# Patient Record
Sex: FEMALE | Race: WHITE | NOT HISPANIC OR LATINO | Employment: FULL TIME | URBAN - METROPOLITAN AREA
[De-identification: names, ages, dates, MRNs, and addresses within clinical notes are randomized per-mention and may not be internally consistent; named-entity substitution may affect disease eponyms.]

---

## 2017-04-19 ENCOUNTER — HISTORICAL (OUTPATIENT)
Dept: LAB | Facility: HOSPITAL | Age: 47
End: 2017-04-19

## 2017-10-23 ENCOUNTER — HISTORICAL (OUTPATIENT)
Dept: ADMINISTRATIVE | Facility: HOSPITAL | Age: 47
End: 2017-10-23

## 2017-10-23 LAB
ABS NEUT (OLG): 2.68 X10(3)/MCL (ref 2.1–9.2)
ALBUMIN SERPL-MCNC: 3.7 GM/DL (ref 3.4–5)
ALBUMIN/GLOB SERPL: 1.3 {RATIO}
ALP SERPL-CCNC: 83 UNIT/L (ref 38–126)
ALT SERPL-CCNC: 44 UNIT/L (ref 12–78)
APPEARANCE, UA: NORMAL
AST SERPL-CCNC: 17 UNIT/L (ref 15–37)
BACTERIA SPEC CULT: NORMAL /HPF
BASOPHILS # BLD AUTO: 0 X10(3)/MCL (ref 0–0.2)
BASOPHILS NFR BLD AUTO: 0 %
BILIRUB SERPL-MCNC: 0.5 MG/DL (ref 0.2–1)
BILIRUB UR QL STRIP: NEGATIVE
BILIRUBIN DIRECT+TOT PNL SERPL-MCNC: 0.1 MG/DL (ref 0–0.2)
BILIRUBIN DIRECT+TOT PNL SERPL-MCNC: 0.4 MG/DL (ref 0–0.8)
BUN SERPL-MCNC: 11 MG/DL (ref 7–18)
CALCIUM SERPL-MCNC: 8.5 MG/DL (ref 8.5–10.1)
CHLORIDE SERPL-SCNC: 105 MMOL/L (ref 98–107)
CHOLEST SERPL-MCNC: 142 MG/DL (ref 0–200)
CHOLEST/HDLC SERPL: 2.1 {RATIO} (ref 0–4)
CO2 SERPL-SCNC: 28 MMOL/L (ref 21–32)
COLOR UR: YELLOW
CREAT SERPL-MCNC: 0.63 MG/DL (ref 0.55–1.02)
EOSINOPHIL # BLD AUTO: 0.1 X10(3)/MCL (ref 0–0.9)
EOSINOPHIL NFR BLD AUTO: 1 %
ERYTHROCYTE [DISTWIDTH] IN BLOOD BY AUTOMATED COUNT: 12.3 % (ref 11.5–17)
FERRITIN SERPL-MCNC: 135.4 NG/ML (ref 8–388)
GLOBULIN SER-MCNC: 2.9 GM/DL (ref 2.4–3.5)
GLUCOSE (UA): NEGATIVE
GLUCOSE SERPL-MCNC: 104 MG/DL (ref 74–106)
HCT VFR BLD AUTO: 39 % (ref 37–47)
HDLC SERPL-MCNC: 67 MG/DL (ref 35–60)
HGB BLD-MCNC: 13.6 GM/DL (ref 12–16)
HGB UR QL STRIP: NEGATIVE
KETONES UR QL STRIP: NEGATIVE
LDLC SERPL CALC-MCNC: 66 MG/DL (ref 0–129)
LEUKOCYTE ESTERASE UR QL STRIP: NEGATIVE
LYMPHOCYTES # BLD AUTO: 1.6 X10(3)/MCL (ref 0.6–4.6)
LYMPHOCYTES NFR BLD AUTO: 34 %
MCH RBC QN AUTO: 31.8 PG (ref 27–31)
MCHC RBC AUTO-ENTMCNC: 34.9 GM/DL (ref 33–36)
MCV RBC AUTO: 91.1 FL (ref 80–94)
MONOCYTES # BLD AUTO: 0.5 X10(3)/MCL (ref 0.1–1.3)
MONOCYTES NFR BLD AUTO: 10 %
NEUTROPHILS # BLD AUTO: 2.68 X10(3)/MCL (ref 1.4–7.9)
NEUTROPHILS NFR BLD AUTO: 55 %
NITRITE UR QL STRIP: NEGATIVE
PH UR STRIP: 5.5 [PH] (ref 5–9)
PLATELET # BLD AUTO: 249 X10(3)/MCL (ref 130–400)
PMV BLD AUTO: 8.2 FL (ref 9.4–12.4)
POTASSIUM SERPL-SCNC: 4 MMOL/L (ref 3.5–5.1)
PROT SERPL-MCNC: 6.6 GM/DL (ref 6.4–8.2)
PROT UR QL STRIP: NEGATIVE
RBC # BLD AUTO: 4.28 X10(6)/MCL (ref 4.2–5.4)
RBC #/AREA URNS HPF: NORMAL /[HPF]
SODIUM SERPL-SCNC: 140 MMOL/L (ref 136–145)
SP GR UR STRIP: 1.01 (ref 1–1.03)
SQUAMOUS EPITHELIAL, UA: NORMAL
T3FREE SERPL-MCNC: 6.87 PG/ML (ref 2.18–3.98)
T4 FREE SERPL-MCNC: 0.83 NG/DL (ref 0.76–1.46)
TRIGL SERPL-MCNC: 44 MG/DL (ref 30–150)
TSH SERPL-ACNC: 0.65 MIU/ML (ref 0.36–3.74)
UROBILINOGEN UR STRIP-ACNC: 0.2
VLDLC SERPL CALC-MCNC: 9 MG/DL
WBC # SPEC AUTO: 4.9 X10(3)/MCL (ref 4.5–11.5)
WBC #/AREA URNS HPF: NORMAL /[HPF]

## 2018-01-08 LAB — RAPID GROUP A STREP (OHS): NEGATIVE

## 2018-04-19 ENCOUNTER — HISTORICAL (OUTPATIENT)
Dept: LAB | Facility: HOSPITAL | Age: 48
End: 2018-04-19

## 2018-04-19 LAB
T3FREE SERPL-MCNC: 3.82 PG/ML (ref 2.18–3.98)
T4 FREE SERPL-MCNC: 0.85 NG/DL (ref 0.76–1.46)
TSH SERPL-ACNC: 0.19 MIU/ML (ref 0.36–3.74)

## 2018-10-25 ENCOUNTER — HISTORICAL (OUTPATIENT)
Dept: LAB | Facility: HOSPITAL | Age: 48
End: 2018-10-25

## 2018-10-25 LAB
T3FREE SERPL-MCNC: 3.87 PG/ML (ref 2.18–3.98)
T4 FREE SERPL-MCNC: 0.84 NG/DL (ref 0.76–1.46)
TSH SERPL-ACNC: 0.22 MIU/L (ref 0.36–3.74)

## 2019-05-07 ENCOUNTER — HISTORICAL (OUTPATIENT)
Dept: LAB | Facility: HOSPITAL | Age: 49
End: 2019-05-07

## 2019-05-07 LAB
ABS NEUT (OLG): 2.21 X10(3)/MCL (ref 2.1–9.2)
ALBUMIN SERPL-MCNC: 4.1 GM/DL (ref 3.4–5)
ALBUMIN/GLOB SERPL: 1.2 RATIO (ref 1.1–2)
ALP SERPL-CCNC: 98 UNIT/L (ref 38–126)
ALT SERPL-CCNC: 43 UNIT/L (ref 12–78)
APPEARANCE, UA: ABNORMAL
AST SERPL-CCNC: 23 UNIT/L (ref 15–37)
BACTERIA SPEC CULT: ABNORMAL /HPF
BASOPHILS # BLD AUTO: 0 X10(3)/MCL (ref 0–0.2)
BASOPHILS NFR BLD AUTO: 0 %
BILIRUB SERPL-MCNC: 1.2 MG/DL (ref 0.2–1)
BILIRUB UR QL STRIP: NEGATIVE
BILIRUBIN DIRECT+TOT PNL SERPL-MCNC: 0.3 MG/DL (ref 0–0.5)
BILIRUBIN DIRECT+TOT PNL SERPL-MCNC: 0.9 MG/DL (ref 0–0.8)
BUN SERPL-MCNC: 9 MG/DL (ref 7–18)
CALCIUM SERPL-MCNC: 9.2 MG/DL (ref 8.5–10.1)
CHLORIDE SERPL-SCNC: 105 MMOL/L (ref 98–107)
CHOLEST SERPL-MCNC: 155 MG/DL (ref 0–200)
CHOLEST/HDLC SERPL: 1.9 {RATIO} (ref 0–4)
CO2 SERPL-SCNC: 30 MMOL/L (ref 21–32)
COLOR UR: YELLOW
CREAT SERPL-MCNC: 0.68 MG/DL (ref 0.55–1.02)
EOSINOPHIL # BLD AUTO: 0.1 X10(3)/MCL (ref 0–0.9)
EOSINOPHIL NFR BLD AUTO: 2 %
ERYTHROCYTE [DISTWIDTH] IN BLOOD BY AUTOMATED COUNT: 12.2 % (ref 11.5–17)
EST. AVERAGE GLUCOSE BLD GHB EST-MCNC: 88 MG/DL
GLOBULIN SER-MCNC: 3.4 GM/DL (ref 2.4–3.5)
GLUCOSE (UA): NEGATIVE
GLUCOSE SERPL-MCNC: 88 MG/DL (ref 74–106)
HBA1C MFR BLD: 4.7 % (ref 4.2–6.3)
HCT VFR BLD AUTO: 43.5 % (ref 37–47)
HDLC SERPL-MCNC: 81 MG/DL (ref 35–60)
HGB BLD-MCNC: 14.2 GM/DL (ref 12–16)
HGB UR QL STRIP: NEGATIVE
KETONES UR QL STRIP: ABNORMAL
LDLC SERPL CALC-MCNC: 63 MG/DL (ref 0–129)
LEUKOCYTE ESTERASE UR QL STRIP: ABNORMAL
LYMPHOCYTES # BLD AUTO: 1.4 X10(3)/MCL (ref 0.6–4.6)
LYMPHOCYTES NFR BLD AUTO: 35 %
MCH RBC QN AUTO: 30.5 PG (ref 27–31)
MCHC RBC AUTO-ENTMCNC: 32.6 GM/DL (ref 33–36)
MCV RBC AUTO: 93.3 FL (ref 80–94)
MONOCYTES # BLD AUTO: 0.4 X10(3)/MCL (ref 0.1–1.3)
MONOCYTES NFR BLD AUTO: 10 %
NEUTROPHILS # BLD AUTO: 2.21 X10(3)/MCL (ref 2.1–9.2)
NEUTROPHILS NFR BLD AUTO: 53 %
NITRITE UR QL STRIP: NEGATIVE
PH UR STRIP: 5 [PH] (ref 5–9)
PLATELET # BLD AUTO: 299 X10(3)/MCL (ref 130–400)
PMV BLD AUTO: 9 FL (ref 9.4–12.4)
POTASSIUM SERPL-SCNC: 4 MMOL/L (ref 3.5–5.1)
PROT SERPL-MCNC: 7.5 GM/DL (ref 6.4–8.2)
PROT UR QL STRIP: NEGATIVE
RBC # BLD AUTO: 4.66 X10(6)/MCL (ref 4.2–5.4)
RBC #/AREA URNS HPF: ABNORMAL /[HPF]
SODIUM SERPL-SCNC: 140 MMOL/L (ref 136–145)
SP GR UR STRIP: 1.02 (ref 1–1.03)
SQUAMOUS EPITHELIAL, UA: 6 /HPF (ref 0–4)
T3FREE SERPL-MCNC: 4.67 PG/ML (ref 2.18–3.98)
T4 FREE SERPL-MCNC: 1.1 NG/DL (ref 0.76–1.46)
TRIGL SERPL-MCNC: 57 MG/DL (ref 30–150)
TSH SERPL-ACNC: 0.27 MIU/L (ref 0.36–3.74)
UROBILINOGEN UR STRIP-ACNC: 1
VLDLC SERPL CALC-MCNC: 11 MG/DL
WBC # SPEC AUTO: 4.2 X10(3)/MCL (ref 4.5–11.5)
WBC #/AREA URNS HPF: 38 /HPF (ref 0–3)

## 2019-05-09 LAB — FINAL CULTURE: NO GROWTH

## 2019-11-20 LAB — NONINV COLON CA DNA+OCC BLD SCRN STL QL: NEGATIVE

## 2020-01-30 ENCOUNTER — HISTORICAL (OUTPATIENT)
Dept: LAB | Facility: HOSPITAL | Age: 50
End: 2020-01-30

## 2020-01-30 LAB
FSH SERPL-ACNC: 78.1 MIU/ML
LH SERPL-ACNC: 38.4 MIU/ML
T3FREE SERPL-MCNC: 4.06 PG/ML (ref 2.18–3.98)
T4 FREE SERPL-MCNC: 1 NG/DL (ref 0.76–1.46)
TSH SERPL-ACNC: 0.2 MIU/L (ref 0.36–3.74)

## 2020-06-13 ENCOUNTER — HISTORICAL (OUTPATIENT)
Dept: LAB | Facility: HOSPITAL | Age: 50
End: 2020-06-13

## 2020-06-13 LAB
ABS NEUT (OLG): 1.25 X10(3)/MCL (ref 2.1–9.2)
ALBUMIN SERPL-MCNC: 3.9 GM/DL (ref 3.4–5)
ALBUMIN/GLOB SERPL: 1.2 RATIO (ref 1.1–2)
ALP SERPL-CCNC: 70 UNIT/L (ref 46–116)
ALT SERPL-CCNC: 38 UNIT/L (ref 12–78)
APPEARANCE, UA: CLEAR
AST SERPL-CCNC: 25 UNIT/L (ref 15–37)
BACTERIA SPEC CULT: NORMAL
BASOPHILS # BLD AUTO: 0 X10(3)/MCL (ref 0–0.2)
BASOPHILS NFR BLD AUTO: 0 %
BILIRUB SERPL-MCNC: 0.5 MG/DL (ref 0.2–1)
BILIRUB UR QL STRIP: NEGATIVE
BILIRUBIN DIRECT+TOT PNL SERPL-MCNC: 0.18 MG/DL (ref 0–0.2)
BILIRUBIN DIRECT+TOT PNL SERPL-MCNC: 0.32 MG/DL (ref 0–0.8)
BUN SERPL-MCNC: 21.9 MG/DL (ref 7–18)
CALCIUM SERPL-MCNC: 9 MG/DL (ref 8.5–10.1)
CHLORIDE SERPL-SCNC: 105 MMOL/L (ref 98–107)
CHOLEST SERPL-MCNC: 151 MG/DL (ref 0–200)
CHOLEST/HDLC SERPL: 2 {RATIO} (ref 0–4)
CO2 SERPL-SCNC: 28.2 MMOL/L (ref 21–32)
COLOR UR: YELLOW
CREAT SERPL-MCNC: 0.71 MG/DL (ref 0.6–1.3)
EOSINOPHIL # BLD AUTO: 0.1 X10(3)/MCL (ref 0–0.9)
EOSINOPHIL NFR BLD AUTO: 4 %
ERYTHROCYTE [DISTWIDTH] IN BLOOD BY AUTOMATED COUNT: 12.4 % (ref 11.5–17)
EST. AVERAGE GLUCOSE BLD GHB EST-MCNC: 94 MG/DL
GLOBULIN SER-MCNC: 3.2 GM/DL (ref 2.4–3.5)
GLUCOSE (UA): NEGATIVE
GLUCOSE SERPL-MCNC: 101 MG/DL (ref 74–106)
HBA1C MFR BLD: 4.9 % (ref 4.5–6.2)
HCT VFR BLD AUTO: 40.7 % (ref 37–47)
HDLC SERPL-MCNC: 76 MG/DL (ref 40–60)
HGB BLD-MCNC: 13.6 GM/DL (ref 12–16)
HGB UR QL STRIP: NEGATIVE
KETONES UR QL STRIP: NEGATIVE
LDLC SERPL CALC-MCNC: 70 MG/DL (ref 0–129)
LEUKOCYTE ESTERASE UR QL STRIP: NEGATIVE
LYMPHOCYTES # BLD AUTO: 1.4 X10(3)/MCL (ref 0.6–4.6)
LYMPHOCYTES NFR BLD AUTO: 44 %
MCH RBC QN AUTO: 30.6 PG (ref 27–31)
MCHC RBC AUTO-ENTMCNC: 33.4 GM/DL (ref 33–36)
MCV RBC AUTO: 91.7 FL (ref 80–94)
MONOCYTES # BLD AUTO: 0.4 X10(3)/MCL (ref 0.1–1.3)
MONOCYTES NFR BLD AUTO: 12 %
NEUTROPHILS # BLD AUTO: 1.25 X10(3)/MCL (ref 1.4–7.9)
NEUTROPHILS NFR BLD AUTO: 40 %
NITRITE UR QL STRIP: NEGATIVE
PH UR STRIP: 5.5 [PH] (ref 5–9)
PLATELET # BLD AUTO: 267 X10(3)/MCL (ref 130–400)
PMV BLD AUTO: 8.8 FL (ref 9.4–12.4)
POTASSIUM SERPL-SCNC: 4.5 MMOL/L (ref 3.5–5.1)
PROT SERPL-MCNC: 7.1 GM/DL (ref 6.4–8.2)
PROT UR QL STRIP: NEGATIVE
RBC # BLD AUTO: 4.44 X10(6)/MCL (ref 4.2–5.4)
RBC #/AREA URNS HPF: NORMAL /[HPF]
SODIUM SERPL-SCNC: 142 MMOL/L (ref 136–145)
SP GR UR STRIP: 1.02 (ref 1–1.03)
SQUAMOUS EPITHELIAL, UA: NORMAL
TRIGL SERPL-MCNC: 23 MG/DL
TSH SERPL-ACNC: 0.1 MIU/ML (ref 0.36–3.74)
UROBILINOGEN UR STRIP-ACNC: 0.2
VLDLC SERPL CALC-MCNC: 5 MG/DL
WBC # SPEC AUTO: 3.1 X10(3)/MCL (ref 4.5–11.5)
WBC #/AREA URNS HPF: NORMAL /[HPF]

## 2020-06-17 ENCOUNTER — HISTORICAL (OUTPATIENT)
Dept: ADMINISTRATIVE | Facility: HOSPITAL | Age: 50
End: 2020-06-17

## 2020-06-17 LAB — T4 FREE SERPL-MCNC: 0.79 NG/DL (ref 0.7–1.48)

## 2020-09-14 ENCOUNTER — HISTORICAL (OUTPATIENT)
Dept: ADMINISTRATIVE | Facility: HOSPITAL | Age: 50
End: 2020-09-14

## 2020-09-14 LAB
T3FREE SERPL-MCNC: 5.11 PG/ML (ref 1.71–3.71)
T4 FREE SERPL-MCNC: 0.89 NG/DL (ref 0.7–1.48)
TSH SERPL-ACNC: 0.68 UIU/ML (ref 0.35–4.94)

## 2020-11-24 LAB — BCS RECOMMENDATION EXT: NORMAL

## 2020-12-30 ENCOUNTER — HISTORICAL (OUTPATIENT)
Dept: ADMINISTRATIVE | Facility: HOSPITAL | Age: 50
End: 2020-12-30

## 2020-12-30 LAB
HCV AB SERPL QL IA: NONREACTIVE
T3FREE SERPL-MCNC: 2.01 PG/ML (ref 1.71–3.71)
T4 FREE SERPL-MCNC: 0.71 NG/DL (ref 0.7–1.48)
TSH SERPL-ACNC: 8.65 UIU/ML (ref 0.35–4.94)

## 2021-05-06 LAB
PAP RECOMMENDATION EXT: NORMAL
PAP SMEAR: NORMAL

## 2021-06-21 ENCOUNTER — HISTORICAL (OUTPATIENT)
Dept: ADMINISTRATIVE | Facility: HOSPITAL | Age: 51
End: 2021-06-21

## 2021-06-21 LAB
ABS NEUT (OLG): 2.65 X10(3)/MCL (ref 2.1–9.2)
ALBUMIN SERPL-MCNC: 3.9 GM/DL (ref 3.5–5)
ALBUMIN/GLOB SERPL: 1.4 RATIO (ref 1.1–2)
ALP SERPL-CCNC: 95 UNIT/L (ref 40–150)
ALT SERPL-CCNC: 98 UNIT/L (ref 0–55)
APPEARANCE, UA: CLEAR
AST SERPL-CCNC: 48 UNIT/L (ref 5–34)
BACTERIA SPEC CULT: ABNORMAL /HPF
BASOPHILS # BLD AUTO: 0 X10(3)/MCL (ref 0–0.2)
BASOPHILS NFR BLD AUTO: 0 %
BILIRUB SERPL-MCNC: 0.7 MG/DL
BILIRUB UR QL STRIP: NEGATIVE
BILIRUBIN DIRECT+TOT PNL SERPL-MCNC: 0.3 MG/DL (ref 0–0.5)
BILIRUBIN DIRECT+TOT PNL SERPL-MCNC: 0.4 MG/DL (ref 0–0.8)
BUN SERPL-MCNC: 14.8 MG/DL (ref 9.8–20.1)
CALCIUM SERPL-MCNC: 9.3 MG/DL (ref 8.4–10.2)
CHLORIDE SERPL-SCNC: 104 MMOL/L (ref 98–107)
CHOLEST SERPL-MCNC: 154 MG/DL
CHOLEST/HDLC SERPL: 2 {RATIO} (ref 0–5)
CO2 SERPL-SCNC: 29 MMOL/L (ref 22–29)
COLOR UR: YELLOW
CREAT SERPL-MCNC: 0.76 MG/DL (ref 0.55–1.02)
DEPRECATED CALCIDIOL+CALCIFEROL SERPL-MC: 28.1 NG/ML (ref 30–80)
EOSINOPHIL # BLD AUTO: 0 X10(3)/MCL (ref 0–0.9)
EOSINOPHIL NFR BLD AUTO: 1 %
ERYTHROCYTE [DISTWIDTH] IN BLOOD BY AUTOMATED COUNT: 12.4 % (ref 11.5–17)
EST. AVERAGE GLUCOSE BLD GHB EST-MCNC: 91.1 MG/DL
GLOBULIN SER-MCNC: 2.7 GM/DL (ref 2.4–3.5)
GLUCOSE (UA): NEGATIVE
GLUCOSE SERPL-MCNC: 110 MG/DL (ref 74–100)
HAV IGM SERPL QL IA: NONREACTIVE
HBA1C MFR BLD: 4.8 %
HBV CORE IGM SERPL QL IA: NONREACTIVE
HBV SURFACE AG SERPL QL IA: NONREACTIVE
HCT VFR BLD AUTO: 42.5 % (ref 37–47)
HCV AB SERPL QL IA: NONREACTIVE
HDLC SERPL-MCNC: 77 MG/DL (ref 35–60)
HEPATITIS PANEL INTERP: NORMAL
HGB BLD-MCNC: 13.6 GM/DL (ref 12–16)
HGB UR QL STRIP: NEGATIVE
KETONES UR QL STRIP: NEGATIVE
LDLC SERPL CALC-MCNC: 69 MG/DL (ref 50–140)
LEUKOCYTE ESTERASE UR QL STRIP: ABNORMAL
LYMPHOCYTES # BLD AUTO: 1.7 X10(3)/MCL (ref 0.6–4.6)
LYMPHOCYTES NFR BLD AUTO: 36 %
MCH RBC QN AUTO: 31.3 PG (ref 27–31)
MCHC RBC AUTO-ENTMCNC: 32 GM/DL (ref 33–36)
MCV RBC AUTO: 97.7 FL (ref 80–94)
MONOCYTES # BLD AUTO: 0.3 X10(3)/MCL (ref 0.1–1.3)
MONOCYTES NFR BLD AUTO: 7 %
NEUTROPHILS # BLD AUTO: 2.65 X10(3)/MCL (ref 2.1–9.2)
NEUTROPHILS NFR BLD AUTO: 56 %
NITRITE UR QL STRIP: NEGATIVE
PH UR STRIP: 6.5 [PH] (ref 5–9)
PLATELET # BLD AUTO: 343 X10(3)/MCL (ref 130–400)
PMV BLD AUTO: 9.1 FL (ref 9.4–12.4)
POTASSIUM SERPL-SCNC: 4 MMOL/L (ref 3.5–5.1)
PROT SERPL-MCNC: 6.6 GM/DL (ref 6.4–8.3)
PROT UR QL STRIP: NEGATIVE
RBC # BLD AUTO: 4.35 X10(6)/MCL (ref 4.2–5.4)
RBC #/AREA URNS HPF: ABNORMAL /[HPF]
SODIUM SERPL-SCNC: 140 MMOL/L (ref 136–145)
SP GR UR STRIP: 1.01 (ref 1–1.03)
SQUAMOUS EPITHELIAL, UA: ABNORMAL /HPF (ref 0–4)
TRIGL SERPL-MCNC: 42 MG/DL (ref 37–140)
TSH SERPL-ACNC: 0.93 UIU/ML (ref 0.35–4.94)
UROBILINOGEN UR STRIP-ACNC: 0.2
VLDLC SERPL CALC-MCNC: 8 MG/DL
WBC # SPEC AUTO: 4.7 X10(3)/MCL (ref 4.5–11.5)
WBC #/AREA URNS HPF: ABNORMAL /[HPF]

## 2021-07-02 LAB — SARS-COV-2 AG RESP QL IA.RAPID: NEGATIVE

## 2021-07-07 ENCOUNTER — HISTORICAL (OUTPATIENT)
Dept: SURGERY | Facility: HOSPITAL | Age: 51
End: 2021-07-07

## 2021-08-25 ENCOUNTER — HISTORICAL (OUTPATIENT)
Dept: RADIOLOGY | Facility: HOSPITAL | Age: 51
End: 2021-08-25

## 2021-12-30 ENCOUNTER — HISTORICAL (OUTPATIENT)
Dept: ADMINISTRATIVE | Facility: HOSPITAL | Age: 51
End: 2021-12-30

## 2021-12-30 LAB
ALBUMIN SERPL-MCNC: 3.7 GM/DL (ref 3.5–5)
ALBUMIN/GLOB SERPL: 1.4 RATIO (ref 1.1–2)
ALP SERPL-CCNC: 87 UNIT/L (ref 40–150)
ALT SERPL-CCNC: 40 UNIT/L (ref 0–55)
AST SERPL-CCNC: 27 UNIT/L (ref 5–34)
BILIRUB SERPL-MCNC: 0.4 MG/DL
BILIRUBIN DIRECT+TOT PNL SERPL-MCNC: 0.2 MG/DL (ref 0–0.5)
BILIRUBIN DIRECT+TOT PNL SERPL-MCNC: 0.2 MG/DL (ref 0–0.8)
BUN SERPL-MCNC: 12.7 MG/DL (ref 9.8–20.1)
CALCIUM SERPL-MCNC: 8.6 MG/DL (ref 8.7–10.5)
CHLORIDE SERPL-SCNC: 108 MMOL/L (ref 98–107)
CO2 SERPL-SCNC: 27 MMOL/L (ref 22–29)
CREAT SERPL-MCNC: 0.71 MG/DL (ref 0.55–1.02)
GLOBULIN SER-MCNC: 2.7 GM/DL (ref 2.4–3.5)
GLUCOSE SERPL-MCNC: 87 MG/DL (ref 74–100)
POTASSIUM SERPL-SCNC: 4.2 MMOL/L (ref 3.5–5.1)
PROT SERPL-MCNC: 6.4 GM/DL (ref 6.4–8.3)
SODIUM SERPL-SCNC: 143 MMOL/L (ref 136–145)
T4 FREE SERPL-MCNC: 0.91 NG/DL (ref 0.7–1.48)
TSH SERPL-ACNC: 0.02 UIU/ML (ref 0.35–4.94)

## 2022-04-10 ENCOUNTER — HISTORICAL (OUTPATIENT)
Dept: ADMINISTRATIVE | Facility: HOSPITAL | Age: 52
End: 2022-04-10

## 2022-04-26 VITALS
SYSTOLIC BLOOD PRESSURE: 134 MMHG | BODY MASS INDEX: 31.36 KG/M2 | OXYGEN SATURATION: 98 % | HEIGHT: 66 IN | WEIGHT: 195.13 LBS | DIASTOLIC BLOOD PRESSURE: 76 MMHG

## 2022-06-29 RX ORDER — FUROSEMIDE 20 MG/1
1 TABLET ORAL DAILY PRN
COMMUNITY
Start: 2021-09-22 | End: 2022-11-22 | Stop reason: SDUPTHER

## 2022-06-29 RX ORDER — LORATADINE 10 MG/1
10 TABLET ORAL DAILY
COMMUNITY
Start: 2021-07-07

## 2022-06-30 ENCOUNTER — OFFICE VISIT (OUTPATIENT)
Dept: FAMILY MEDICINE | Facility: CLINIC | Age: 52
End: 2022-06-30
Payer: COMMERCIAL

## 2022-06-30 VITALS
HEIGHT: 66 IN | BODY MASS INDEX: 29.44 KG/M2 | WEIGHT: 183.19 LBS | RESPIRATION RATE: 17 BRPM | DIASTOLIC BLOOD PRESSURE: 72 MMHG | HEART RATE: 67 BPM | OXYGEN SATURATION: 98 % | TEMPERATURE: 98 F | SYSTOLIC BLOOD PRESSURE: 138 MMHG

## 2022-06-30 DIAGNOSIS — Z00.00 PREVENTATIVE HEALTH CARE: ICD-10-CM

## 2022-06-30 DIAGNOSIS — E03.8 OTHER SPECIFIED HYPOTHYROIDISM: Primary | ICD-10-CM

## 2022-06-30 DIAGNOSIS — F41.9 ANXIETY: Chronic | ICD-10-CM

## 2022-06-30 PROBLEM — Z83.49 FAMILY HISTORY OF HEMOCHROMATOSIS: Status: ACTIVE | Noted: 2022-06-30

## 2022-06-30 PROBLEM — R23.2 FLUSHING: Status: ACTIVE | Noted: 2022-06-30

## 2022-06-30 PROBLEM — E66.9 OBESITY: Chronic | Status: ACTIVE | Noted: 2022-06-30

## 2022-06-30 PROBLEM — R42 VERTIGO: Status: ACTIVE | Noted: 2022-06-30

## 2022-06-30 PROBLEM — E03.4 ACQUIRED ATROPHY OF THYROID: Status: ACTIVE | Noted: 2022-06-30

## 2022-06-30 PROBLEM — A49.02 METHICILLIN RESISTANT STAPHYLOCOCCUS AUREUS INFECTION: Status: ACTIVE | Noted: 2022-06-30

## 2022-06-30 PROBLEM — Z83.49 FAMILY HISTORY OF HEMOCHROMATOSIS: Chronic | Status: ACTIVE | Noted: 2022-06-30

## 2022-06-30 PROBLEM — E66.9 OBESITY: Status: ACTIVE | Noted: 2022-06-30

## 2022-06-30 PROBLEM — R42 VERTIGO: Chronic | Status: ACTIVE | Noted: 2022-06-30

## 2022-06-30 PROCEDURE — 3075F PR MOST RECENT SYSTOLIC BLOOD PRESS GE 130-139MM HG: ICD-10-PCS | Mod: CPTII,,, | Performed by: FAMILY MEDICINE

## 2022-06-30 PROCEDURE — 3008F BODY MASS INDEX DOCD: CPT | Mod: CPTII,,, | Performed by: FAMILY MEDICINE

## 2022-06-30 PROCEDURE — 99213 OFFICE O/P EST LOW 20 MIN: CPT | Mod: ,,, | Performed by: FAMILY MEDICINE

## 2022-06-30 PROCEDURE — 3008F PR BODY MASS INDEX (BMI) DOCUMENTED: ICD-10-PCS | Mod: CPTII,,, | Performed by: FAMILY MEDICINE

## 2022-06-30 PROCEDURE — 1159F PR MEDICATION LIST DOCUMENTED IN MEDICAL RECORD: ICD-10-PCS | Mod: CPTII,,, | Performed by: FAMILY MEDICINE

## 2022-06-30 PROCEDURE — 99213 PR OFFICE/OUTPT VISIT, EST, LEVL III, 20-29 MIN: ICD-10-PCS | Mod: ,,, | Performed by: FAMILY MEDICINE

## 2022-06-30 PROCEDURE — 3075F SYST BP GE 130 - 139MM HG: CPT | Mod: CPTII,,, | Performed by: FAMILY MEDICINE

## 2022-06-30 PROCEDURE — 1160F RVW MEDS BY RX/DR IN RCRD: CPT | Mod: CPTII,,, | Performed by: FAMILY MEDICINE

## 2022-06-30 PROCEDURE — 1159F MED LIST DOCD IN RCRD: CPT | Mod: CPTII,,, | Performed by: FAMILY MEDICINE

## 2022-06-30 PROCEDURE — 1160F PR REVIEW ALL MEDS BY PRESCRIBER/CLIN PHARMACIST DOCUMENTED: ICD-10-PCS | Mod: CPTII,,, | Performed by: FAMILY MEDICINE

## 2022-06-30 PROCEDURE — 3078F PR MOST RECENT DIASTOLIC BLOOD PRESSURE < 80 MM HG: ICD-10-PCS | Mod: CPTII,,, | Performed by: FAMILY MEDICINE

## 2022-06-30 PROCEDURE — 3078F DIAST BP <80 MM HG: CPT | Mod: CPTII,,, | Performed by: FAMILY MEDICINE

## 2022-06-30 NOTE — PROGRESS NOTES
Subjective:      Patient ID: Nga Neves is a 51 y.o. female.    Chief Complaint: 6 month follow up    Disclaimer:  This note is prepared using voice recognition software and as such is likely to have errors despite attempts at proofreading. Please contact me for questions.     51-year-old female with history of anxiety and hypothyroidism who presents for follow-up evaluation.  The patient states that she has had some increasing frequency of hot flashes however denies any cold or heat intolerance.  She also denies any chest pain, shortness a breath or significant weight changes.  The patient admits to occasional palpitations 2-3 times per month that is chronic.  She denies any increase in frequency.  She states that she has lost approximately 90 lb within the last 2 years intentionally.  She admits to exercising daily and healthy diet.  The patient also admits to history of anxiety and states that she is not taking any psychotropic medications.  The patient states that she is relaxation techniques and exercise to improve symptoms of anxiety.  She denies any recent panic attacks and denies any suicidal ideation.  The patient states she has had some increase in stress and anxiousness due to marital difficulties.  She is not interested in psychotropic medications.    Past Medical History:   Diagnosis Date    Anxiety 6/30/2022    Family history of hemochromatosis 6/30/2022    Hot flashes 6/30/2022    Hypothyroidism 6/30/2022    Hypothyroidism, unspecified     Methicillin resistant Staphylococcus aureus infection 6/30/2022    2007    Vertigo 6/30/2022        Current Outpatient Medications on File Prior to Visit   Medication Sig Dispense Refill    furosemide (LASIX) 20 MG tablet Take 1 tablet by mouth daily as needed.      loratadine (CLARITIN) 10 mg tablet Take 10 mg by mouth once daily.      thyroid, pork, (ARMOUR THYROID) 180 mg Tab Take 180 mg by mouth once daily.       No current  "facility-administered medications on file prior to visit.        Review of patient's allergies indicates:  No Known Allergies     Lab Results   Component Value Date    WBC 4.7 06/21/2021    HGB 13.6 06/21/2021    HCT 42.5 06/21/2021     06/21/2021    CHOL 154 06/21/2021    TRIG 42 06/21/2021    HDL 77 (H) 06/21/2021    ALT 40 12/30/2021    AST 27 12/30/2021     12/30/2021    K 4.2 12/30/2021    CREATININE 0.71 12/30/2021    BUN 12.7 12/30/2021    CO2 27 12/30/2021    TSH 0.0203 (L) 12/30/2021    HGBA1C 4.8 06/21/2021    [id number] 28.1 (L) 06/21/2021    CALCIUM 8.6 (L) 12/30/2021       Review of Systems   Constitutional: Negative for chills and fever.   Eyes: Negative for blurred vision and double vision.   Respiratory: Negative for cough and shortness of breath.    Cardiovascular: Positive for palpitations. Negative for chest pain and leg swelling.   Gastrointestinal: Negative for abdominal pain and vomiting.   Skin: Negative for rash.   Neurological: Negative for dizziness and headaches.   Psychiatric/Behavioral: Negative for suicidal ideas. The patient is nervous/anxious.        Objective:     Vitals:    06/30/22 1101 06/30/22 1127   BP: (!) 146/75 138/72   BP Location: Left arm Left arm   Patient Position: Sitting Sitting   BP Method: Large (Automatic) Large (Manual)   Pulse: 67    Resp: 17    Temp: 98 °F (36.7 °C)    TempSrc: Oral    SpO2: 98%    Weight: 83.1 kg (183 lb 3.2 oz)    Height: 5' 6" (1.676 m)      Physical Exam  Vitals reviewed.   Constitutional:       General: She is not in acute distress.     Appearance: Normal appearance. She is not ill-appearing, toxic-appearing or diaphoretic.   HENT:      Head: Normocephalic.   Eyes:      General:         Right eye: No discharge.         Left eye: No discharge.      Extraocular Movements: Extraocular movements intact.      Conjunctiva/sclera: Conjunctivae normal.   Cardiovascular:      Rate and Rhythm: Normal rate and regular rhythm.      " Pulses: Normal pulses.      Heart sounds: Normal heart sounds. No murmur heard.    No friction rub. No gallop.   Pulmonary:      Effort: Pulmonary effort is normal. No respiratory distress.      Breath sounds: Normal breath sounds. No stridor. No wheezing, rhonchi or rales.   Musculoskeletal:         General: Normal range of motion.      Cervical back: Normal range of motion and neck supple. No rigidity or tenderness.   Lymphadenopathy:      Cervical: No cervical adenopathy.   Skin:     General: Skin is warm and dry.      Coloration: Skin is not pale.   Neurological:      General: No focal deficit present.      Mental Status: She is alert and oriented to person, place, and time. Mental status is at baseline.   Psychiatric:         Mood and Affect: Mood normal.         Behavior: Behavior normal.         Thought Content: Thought content normal.         Judgment: Judgment normal.         Assessment:     1. Hypothyroidism    2. Anxiety      Plan:   Nga was seen today for 6 month follow up.    Diagnoses and all orders for this visit:    Hypothyroidism  -     TSH; Future  -     T4, Free; Future  Continue Victor thyroid 180 mg daily.  Monitor for cold or heat intolerance, persistent palpitations, chest pain, shortness a breath, weight changes or any other worsening symptoms.  Increase water intake  Patient will be treated appropriately based on results of thyroid function test.  Contact clinic if any concerns arise    Anxiety  Stable per patient.  Continue regular exercise 30 min/day at least 5 days/wk.  Denies suicidal ideation, panic attacks, SOB, or chest pain.  Recommend relaxation techniques and coping strategies (i.e. essential oils, deep breathing, exercise, etc).  Seek immediate medical treatment for SOB, persistent panic attack, chest pain, suicidal thoughts or hallucinations.    Follow up in about 2 months (around 8/30/2022) for Wellness Visit.  Wellness labs ordered to be completed prior to visit.  Orders  Placed This Encounter   Procedures    TSH     Standing Status:   Future     Standing Expiration Date:   8/29/2023    T4, Free     Standing Status:   Future     Standing Expiration Date:   8/29/2023    Vitamin D     Standing Status:   Future     Standing Expiration Date:   9/30/2022    Lipid Panel     Standing Status:   Future     Standing Expiration Date:   9/30/2022    Comprehensive Metabolic Panel     Standing Status:   Future     Standing Expiration Date:   9/30/2022    CBC Auto Differential     Standing Status:   Future     Standing Expiration Date:   9/30/2022    Urinalysis, Reflex to Urine Culture Urine, Clean Catch     Order Specific Question:   Preferred Collection Type     Answer:   Urine, Clean Catch     Order Specific Question:   Specimen Source     Answer:   Urine    Hemoglobin A1C     Standing Status:   Future     Standing Expiration Date:   9/30/2022     Nga Sanchezbodeaux Pura was given education on their disease process and medications.

## 2022-07-01 ENCOUNTER — LAB VISIT (OUTPATIENT)
Dept: LAB | Facility: HOSPITAL | Age: 52
End: 2022-07-01
Attending: FAMILY MEDICINE
Payer: COMMERCIAL

## 2022-07-01 DIAGNOSIS — E03.8 OTHER SPECIFIED HYPOTHYROIDISM: ICD-10-CM

## 2022-07-01 DIAGNOSIS — Z00.00 PREVENTATIVE HEALTH CARE: ICD-10-CM

## 2022-07-01 LAB
ALBUMIN SERPL-MCNC: 3.8 GM/DL (ref 3.5–5)
ALBUMIN/GLOB SERPL: 1.7 RATIO (ref 1.1–2)
ALP SERPL-CCNC: 87 UNIT/L (ref 40–150)
ALT SERPL-CCNC: 63 UNIT/L (ref 0–55)
APPEARANCE UR: CLEAR
AST SERPL-CCNC: 32 UNIT/L (ref 5–34)
BACTERIA #/AREA URNS AUTO: NORMAL /HPF
BASOPHILS # BLD AUTO: 0.02 X10(3)/MCL (ref 0–0.2)
BASOPHILS NFR BLD AUTO: 0.6 %
BILIRUB UR QL STRIP.AUTO: NEGATIVE MG/DL
BILIRUBIN DIRECT+TOT PNL SERPL-MCNC: 0.7 MG/DL
BUN SERPL-MCNC: 15.7 MG/DL (ref 9.8–20.1)
CALCIUM SERPL-MCNC: 9.2 MG/DL (ref 8.4–10.2)
CHLORIDE SERPL-SCNC: 110 MMOL/L (ref 98–107)
CHOLEST SERPL-MCNC: 144 MG/DL
CHOLEST/HDLC SERPL: 2 {RATIO} (ref 0–5)
CO2 SERPL-SCNC: 30 MMOL/L (ref 22–29)
COLOR UR AUTO: YELLOW
CREAT SERPL-MCNC: 0.75 MG/DL (ref 0.55–1.02)
DEPRECATED CALCIDIOL+CALCIFEROL SERPL-MC: 26.9 NG/ML (ref 30–80)
EOSINOPHIL # BLD AUTO: 0.03 X10(3)/MCL (ref 0–0.9)
EOSINOPHIL NFR BLD AUTO: 0.9 %
ERYTHROCYTE [DISTWIDTH] IN BLOOD BY AUTOMATED COUNT: 12.1 % (ref 11.5–17)
EST. AVERAGE GLUCOSE BLD GHB EST-MCNC: 88.2 MG/DL
GLOBULIN SER-MCNC: 2.3 GM/DL (ref 2.4–3.5)
GLUCOSE SERPL-MCNC: 86 MG/DL (ref 74–100)
GLUCOSE UR QL STRIP.AUTO: NEGATIVE MG/DL
HBA1C MFR BLD: 4.7 %
HCT VFR BLD AUTO: 40.7 % (ref 37–47)
HDLC SERPL-MCNC: 77 MG/DL (ref 35–60)
HGB BLD-MCNC: 13.3 GM/DL (ref 12–16)
IMM GRANULOCYTES # BLD AUTO: 0.01 X10(3)/MCL (ref 0–0.04)
IMM GRANULOCYTES NFR BLD AUTO: 0.3 %
KETONES UR QL STRIP.AUTO: NEGATIVE MG/DL
LDLC SERPL CALC-MCNC: 62 MG/DL (ref 50–140)
LEUKOCYTE ESTERASE UR QL STRIP.AUTO: ABNORMAL UNIT/L
LYMPHOCYTES # BLD AUTO: 1.06 X10(3)/MCL (ref 0.6–4.6)
LYMPHOCYTES NFR BLD AUTO: 30.5 %
MCH RBC QN AUTO: 31.1 PG (ref 27–31)
MCHC RBC AUTO-ENTMCNC: 32.7 MG/DL (ref 33–36)
MCV RBC AUTO: 95.1 FL (ref 80–94)
MONOCYTES # BLD AUTO: 0.28 X10(3)/MCL (ref 0.1–1.3)
MONOCYTES NFR BLD AUTO: 8.1 %
NEUTROPHILS # BLD AUTO: 2.1 X10(3)/MCL (ref 2.1–9.2)
NEUTROPHILS NFR BLD AUTO: 59.6 %
NITRITE UR QL STRIP.AUTO: NEGATIVE
NRBC BLD AUTO-RTO: 0 %
PH UR STRIP.AUTO: 5.5 [PH]
PLATELET # BLD AUTO: 221 X10(3)/MCL (ref 130–400)
PMV BLD AUTO: 9.2 FL (ref 7.4–10.4)
POTASSIUM SERPL-SCNC: 4.6 MMOL/L (ref 3.5–5.1)
PROT SERPL-MCNC: 6.1 GM/DL (ref 6.4–8.3)
PROT UR QL STRIP.AUTO: NEGATIVE MG/DL
RBC # BLD AUTO: 4.28 X10(6)/MCL (ref 4.2–5.4)
RBC #/AREA URNS AUTO: <5 /HPF
RBC UR QL AUTO: NEGATIVE UNIT/L
SODIUM SERPL-SCNC: 143 MMOL/L (ref 136–145)
SP GR UR STRIP.AUTO: 1.02 (ref 1–1.03)
SQUAMOUS #/AREA URNS AUTO: <5 /HPF
T4 FREE SERPL-MCNC: 0.81 NG/DL (ref 0.7–1.48)
TRIGL SERPL-MCNC: 27 MG/DL (ref 37–140)
TSH SERPL-ACNC: 0.1 UIU/ML (ref 0.35–4.94)
UROBILINOGEN UR STRIP-ACNC: 0.2 MG/DL
VLDLC SERPL CALC-MCNC: 5 MG/DL
WBC # SPEC AUTO: 3.5 X10(3)/MCL (ref 4.5–11.5)
WBC #/AREA URNS AUTO: <5 /HPF

## 2022-07-01 PROCEDURE — 80053 COMPREHEN METABOLIC PANEL: CPT

## 2022-07-01 PROCEDURE — 83036 HEMOGLOBIN GLYCOSYLATED A1C: CPT

## 2022-07-01 PROCEDURE — 84439 ASSAY OF FREE THYROXINE: CPT

## 2022-07-01 PROCEDURE — 85025 COMPLETE CBC W/AUTO DIFF WBC: CPT

## 2022-07-01 PROCEDURE — 84443 ASSAY THYROID STIM HORMONE: CPT

## 2022-07-01 PROCEDURE — 36415 COLL VENOUS BLD VENIPUNCTURE: CPT

## 2022-07-01 PROCEDURE — 80061 LIPID PANEL: CPT

## 2022-07-01 PROCEDURE — 82306 VITAMIN D 25 HYDROXY: CPT

## 2022-07-05 ENCOUNTER — TELEPHONE (OUTPATIENT)
Dept: FAMILY MEDICINE | Facility: CLINIC | Age: 52
End: 2022-07-05
Payer: COMMERCIAL

## 2022-07-05 NOTE — TELEPHONE ENCOUNTER
----- Message from Caleb Gonzales MD sent at 7/5/2022  8:47 AM CDT -----  2+ leukocyte esterase on urinalysis however no bacteria seen, negative nitrite, and negative for WBCs. Repeat UA at next visit however if symptomatic recommend urine culture.

## 2022-07-06 ENCOUNTER — TELEPHONE (OUTPATIENT)
Dept: FAMILY MEDICINE | Facility: CLINIC | Age: 52
End: 2022-07-06
Payer: COMMERCIAL

## 2022-07-06 RX ORDER — THYROID, PORCINE 300 MG/1
150 TABLET ORAL DAILY
Qty: 30 TABLET | Refills: 3 | Status: SHIPPED | OUTPATIENT
Start: 2022-07-06 | End: 2022-08-30 | Stop reason: SDUPTHER

## 2022-07-06 NOTE — TELEPHONE ENCOUNTER
Patient stated that she seen on her ultrasound report in her chart from 2021  that it showed she  had a small cyst on her liver and she is concerned that maybe that's the reason her liver enzymes are elevated, she also stated that a provider never spoke with her in regards to the results. Please advise.

## 2022-07-06 NOTE — TELEPHONE ENCOUNTER
----- Message from Caleb Gonzales MD sent at 7/6/2022 11:58 AM CDT -----  Patient's TSH remain low and free T4 WNL.As patient has been symptomatic recommend decreasing dose of Carlisle thyroid to 150 mg daily. Rx sent to pharmacy. Mildly decreased vitamin D level. Recommend vitamin D3 2000 units daily OTC. Mildly elevated bicarb and chloride levels. Increase water intake. ALT elevated mildly. Repeat CMP in 6 weeks. Limit alcohol and hepatotoxic medications such as Tylenol. Total cholesterol, LDL, and HDL at goal. Triglycerides mildly low. Repeat lipid panel in 1 year. Hgb/Hct and A1c WNL.

## 2022-07-07 ENCOUNTER — TELEPHONE (OUTPATIENT)
Dept: FAMILY MEDICINE | Facility: CLINIC | Age: 52
End: 2022-07-07
Payer: COMMERCIAL

## 2022-07-07 NOTE — TELEPHONE ENCOUNTER
----- Message from YOSI Lama sent at 7/7/2022  3:38 PM CDT -----  Start Zyrtec 10mg daily + Flonase BID.   Avoid Irritants and allergens.  Wash hands frequently to prevent common colds.  Drink plenty of fluids to thin mucous and/or use humidifier.    Consider NeilMed Saline Sinus Rinse BID.  Apply warm compress for sinus pain.  Use OTC decongestants as needed (HTN patients to avoid sudafed products).   If no improvement or she develops worsening pain/fever, needs to be evaluated.    ----- Message -----  From: Napoleon Barrientos LPN  Sent: 7/7/2022  11:36 AM CDT  To: YOSI Lama    Please Advise           ----- Message -----  From: Aneesh Jurado  Sent: 7/7/2022  11:12 AM CDT  To: Vishal Stallworth Staff    .Type:  Needs Medical Advice    Who Called: Nga  Symptoms (please be specific):  She is congested and ears are hurting.    How long has patient had these symptoms: Monday evening    Pharmacy name and phone #:  [facility] in [place]  Would the patient rather a call back or a response via [facility]?   Best Call Back Number: [phone]  Additional Information: Please call back when available.             no apparent

## 2022-08-01 LAB — BCS RECOMMENDATION EXT: NORMAL

## 2022-08-30 ENCOUNTER — OFFICE VISIT (OUTPATIENT)
Dept: FAMILY MEDICINE | Facility: CLINIC | Age: 52
End: 2022-08-30
Payer: COMMERCIAL

## 2022-08-30 VITALS
HEART RATE: 61 BPM | OXYGEN SATURATION: 98 % | HEIGHT: 66 IN | SYSTOLIC BLOOD PRESSURE: 122 MMHG | DIASTOLIC BLOOD PRESSURE: 76 MMHG | WEIGHT: 180.38 LBS | RESPIRATION RATE: 17 BRPM | BODY MASS INDEX: 28.99 KG/M2 | TEMPERATURE: 98 F

## 2022-08-30 DIAGNOSIS — E03.8 OTHER SPECIFIED HYPOTHYROIDISM: Chronic | ICD-10-CM

## 2022-08-30 DIAGNOSIS — Z12.11 SCREENING FOR MALIGNANT NEOPLASM OF COLON: ICD-10-CM

## 2022-08-30 DIAGNOSIS — J30.2 SEASONAL ALLERGIES: ICD-10-CM

## 2022-08-30 DIAGNOSIS — Z00.00 WELL ADULT EXAM: Primary | ICD-10-CM

## 2022-08-30 DIAGNOSIS — F41.9 ANXIETY: Chronic | ICD-10-CM

## 2022-08-30 PROCEDURE — 3074F PR MOST RECENT SYSTOLIC BLOOD PRESSURE < 130 MM HG: ICD-10-PCS | Mod: CPTII,,, | Performed by: NURSE PRACTITIONER

## 2022-08-30 PROCEDURE — 3008F PR BODY MASS INDEX (BMI) DOCUMENTED: ICD-10-PCS | Mod: CPTII,,, | Performed by: NURSE PRACTITIONER

## 2022-08-30 PROCEDURE — 1159F MED LIST DOCD IN RCRD: CPT | Mod: CPTII,,, | Performed by: NURSE PRACTITIONER

## 2022-08-30 PROCEDURE — 99396 PR PREVENTIVE VISIT,EST,40-64: ICD-10-PCS | Mod: ,,, | Performed by: NURSE PRACTITIONER

## 2022-08-30 PROCEDURE — 3074F SYST BP LT 130 MM HG: CPT | Mod: CPTII,,, | Performed by: NURSE PRACTITIONER

## 2022-08-30 PROCEDURE — 1159F PR MEDICATION LIST DOCUMENTED IN MEDICAL RECORD: ICD-10-PCS | Mod: CPTII,,, | Performed by: NURSE PRACTITIONER

## 2022-08-30 PROCEDURE — 3008F BODY MASS INDEX DOCD: CPT | Mod: CPTII,,, | Performed by: NURSE PRACTITIONER

## 2022-08-30 PROCEDURE — 3078F PR MOST RECENT DIASTOLIC BLOOD PRESSURE < 80 MM HG: ICD-10-PCS | Mod: CPTII,,, | Performed by: NURSE PRACTITIONER

## 2022-08-30 PROCEDURE — 99396 PREV VISIT EST AGE 40-64: CPT | Mod: ,,, | Performed by: NURSE PRACTITIONER

## 2022-08-30 PROCEDURE — 3078F DIAST BP <80 MM HG: CPT | Mod: CPTII,,, | Performed by: NURSE PRACTITIONER

## 2022-08-30 PROCEDURE — 1160F PR REVIEW ALL MEDS BY PRESCRIBER/CLIN PHARMACIST DOCUMENTED: ICD-10-PCS | Mod: CPTII,,, | Performed by: NURSE PRACTITIONER

## 2022-08-30 PROCEDURE — 1160F RVW MEDS BY RX/DR IN RCRD: CPT | Mod: CPTII,,, | Performed by: NURSE PRACTITIONER

## 2022-08-30 RX ORDER — THYROID, PORCINE 300 MG/1
150 TABLET ORAL DAILY
Qty: 45 TABLET | Refills: 3 | Status: SHIPPED | OUTPATIENT
Start: 2022-08-30 | End: 2023-09-25 | Stop reason: SDUPTHER

## 2022-08-30 NOTE — PROGRESS NOTES
Patient ID: [id number]     Chief Complaint: Annual Exam        HPI:     Nga Neves is a 52 y.o. female here today for an annual wellness visit. Reviewed and discussed lab results. Mild transaminitis. Had normal hepatitis panel and US Abdomen last year for this. Overall she feels well. Reports having abnormal pap with Dr. Means recently. Plans to repeat in 3 months.  No other complaints today.         ----------------------------  Anxiety  Family history of hemochromatosis  Hot flashes  Hypothyroidism  Hypothyroidism, unspecified  Methicillin resistant Staphylococcus aureus infection      Comment:  2007  Seasonal allergies  Vertigo     Past Surgical History:   Procedure Laterality Date    ABDOMINOPLASTY N/A 07/07/2021    Abdominoplasty and liposuction N/A     APPENDECTOMY      BUNIONECTOMY N/A     Liposuction (Flank, Bilateral) Bilateral 07/07/2021    Mammogram  11/24/2020    MASTOPEXY  07/07/2021    REDUCTION OF BOTH BREASTS Bilateral 07/07/2021    Skin care      surgery on back due to mrsa 2007      TONSILLECTOMY AND ADENOIDECTOMY         Review of patient's allergies indicates:  No Known Allergies    Outpatient Medications Marked as Taking for the 8/30/22 encounter (Office Visit) with YOSI Lama   Medication Sig Dispense Refill    furosemide (LASIX) 20 MG tablet Take 1 tablet by mouth daily as needed.      loratadine (CLARITIN) 10 mg tablet Take 10 mg by mouth once daily.      [DISCONTINUED] thyroid, pork, (ARMOUR THYROID) 300 mg Tab Take 150 mg by mouth once daily. Take 1/2 tablet = 150 mg daily. 30 tablet 3       Social History     Socioeconomic History    Marital status:    Tobacco Use    Smoking status: Never    Smokeless tobacco: Never   Substance and Sexual Activity    Alcohol use: Yes     Comment: 1-2 times per month    Drug use: Never    Sexual activity: Yes        Family History   Family history unknown: Yes        Patient Care Team:  YOSI Lama as PCP -  "General (Internal Medicine)  Per Means MD as Consulting Physician (Obstetrics and Gynecology)       Subjective:     ROS    See HPI for details    Constitutional: Denies Change in appetite. Denies Chills. Denies Fever. Denies Night sweats.  Eye: Denies Blurred vision. Denies Discharge. Denies Eye pain.  ENT: Denies Decreased hearing. Denies Sore throat. Denies Swollen glands.  Respiratory: Denies Cough. Denies Shortness of breath. Denies Shortness of breath with exertion. Denies Wheezing.  Cardiovascular: DeniesChest pain at rest. Denies Chest pain with exertion. Denies Irregular heartbeat. Denies Palpitations. Denies Edema.  Gastrointestinal: Denies Abdominal pain. DeniesDiarrhea. Denies Nausea. Denies Vomiting. Denies Hematemesis or Hematochezia.  Genitourinary: Denies Dysuria. Denies Urinary frequency. Denies Urinary urgency. Denies Blood in urine.  Endocrine: Denies Cold intolerance. Denies Excessive thirst. Denies Heat intolerance. Denies Weight loss. Denies Weight gain.  Musculoskeletal: Denies Painful joints. Denies Weakness.  Integumentary: Denies Rash. Denies Itching. Denies Dry skin.  Neurologic: Denies Dizziness. Denies Fainting. Denies Headache.  Psychiatric: Denies Depression. Denies Anxiety. Denies Suicidal/Homicidal ideations.    All Other ROS: Negative except as stated in HPI.       Objective:     /76 (BP Location: Right arm, Patient Position: Sitting, BP Method: Large (Manual))   Pulse 61   Temp 98.4 °F (36.9 °C) (Oral)   Resp 17   Ht 5' 6" (1.676 m)   Wt 81.8 kg (180 lb 6.4 oz)   SpO2 98%   BMI 29.12 kg/m²     Physical Exam    General: Alert and oriented, No acute distress.  Head: Normocephalic, Atraumatic.  Eye: Pupils are equal, round and reactive to light, Extraocular movements are intact, Sclera non-icteric.  Ears/Nose/Throat: Normal, Mucosa moist,Clear.  Neck/Thyroid: Supple, Non-tender, No carotid bruit, No palpable thyromegaly or thyroid nodule, No lymphadenopathy, No " JVD, Full range of motion.  Respiratory: Clear to auscultation bilaterally; No wheezes, rales or rhonchi, Non-labored respirations, Symmetrical chest wall expansion.  Cardiovascular: Regular rate and rhythm, S1/S2 normal, No murmurs, rubs or gallops.  Gastrointestinal: Soft, Non-tender, Non-distended, Normal bowel sounds, No palpable organomegaly.  Musculoskeletal: Normal range of motion.  Integumentary: Warm, Dry, Intact, No suspicious lesions or rashes.  Extremities: No clubbing, cyanosis or edema  Neurologic: No focal deficits, Cranial Nerves II-XII are grossly intact, Motor strength normal upper and lower extremities, Sensory exam intact.  Psychiatric: Normal interaction, Coherent speech, Euthymic mood, Appropriate affect       Assessment:       ICD-10-CM ICD-9-CM   1. Well adult exam  Z00.00 V70.0   2. Hypothyroidism  E03.8 244.8   3. Anxiety  F41.9 300.00   4. Seasonal allergies  J30.2 477.9   5. Screening for malignant neoplasm of colon  Z12.11 V76.51        Plan:         Health Maintenance Topics with due status: Not Due       Topic Last Completion Date    TETANUS VACCINE 04/19/2017    Influenza Vaccine 10/07/2021    Lipid Panel 07/01/2022        Cervical Cancer Screening - Last Pap on 5/2021. NIL. Reports having abnormal pap recently - will obtain. Follow up with GYN Clinic for Pap/Pelvic.    Breast Cancer Screening - Last Mammogram on 7/2023 with [facility]. Normal. Follow up in 1 year    Colon Cancer Screening - Cologuard Negative on 2019.    Osteoporosis Screening - Will initiate at 54.     Eye Exam - Recommend annually.    Dental Exam - Recommend biannual exams.     Vaccinations -   Immunization History   Administered Date(s) Administered    Influenza 10/04/2010    Influenza - Quadrivalent 10/12/2015    Influenza - Trivalent - PF (ADULT) 10/03/2011, 10/05/2016, 10/10/2017, 10/25/2018, 10/22/2019, 10/19/2020, 10/07/2021    Tdap 04/19/2017            1. Well adult exam    2. Hypothyroidism  TSH 0.1 / Free T4  0.81 - 7/2022  Continue Zion 150mg daily.  Take medicine on an empty stomach with water (no other medications or beverages). Wait 30 minutes to eat or drink.  Report any symptoms of thinning hair, breaking nails, fatigue, weight gain or loss, palpitations.       3. Anxiety  Well controlled without psychotropic.   Practice deep breathing or abdominal breathing exercises when anxiety occurs.  Exercise daily. Get sunlight daily.  Avoid caffeine, alcohol and stimulants.  Practice positive phrases and repeat throughout the day, yoga, lavender scents or Chamomile tea will help anxiety.  Set healthy boundaries, avoid people and conversations that increase stress.  Reports any symptoms of suicidal or homicidal ideations immediately, if clinic is closed go to nearest emergency room.      4. Seasonal allergies  Continue Claritin 10mg daily   Avoid/limit triggers such as pollen, dust, molds and animal dander.  Avoid smoke, strong chemicals, and strong cleaning products in addition to strong perfumes/colognes.  Use rescue inhaler when needed, use nebulizer for wheezing or URI.  Report Upper Respiratory Infection in early stages and seek treatment.        Medication List with Changes/Refills   Current Medications    FUROSEMIDE (LASIX) 20 MG TABLET    Take 1 tablet by mouth daily as needed.       Start Date: 9/22/2021 End Date: --    LORATADINE (CLARITIN) 10 MG TABLET    Take 10 mg by mouth once daily.       Start Date: 7/7/2021  End Date: --   Changed and/or Refilled Medications    Modified Medication Previous Medication    THYROID, PORK, (ARMOUR THYROID) 300 MG TAB thyroid, pork, (ARMOUR THYROID) 300 mg Tab       Take 150 mg by mouth once daily. Take 1/2 tablet = 150 mg daily.    Take 150 mg by mouth once daily. Take 1/2 tablet = 150 mg daily.       Start Date: 8/30/2022 End Date: 11/28/2022    Start Date: 7/6/2022  End Date: 8/30/2022          The patient's Health Maintenance was reviewed and the following appears to be due  at this time:   Health Maintenance Due   Topic Date Due    Cervical Cancer Screening  Never done    COVID-19 Vaccine (1) Never done    HIV Screening  Never done    Mammogram  Never done    Colorectal Cancer Screening  Never done    Shingles Vaccine (1 of 2) Never done         Follow up in about 6 months (around 2/28/2023) for Routine Follow Up. In addition to their scheduled follow up, the patient has also been instructed to follow up on as needed basis.

## 2022-09-06 ENCOUNTER — DOCUMENTATION ONLY (OUTPATIENT)
Dept: ADMINISTRATIVE | Facility: HOSPITAL | Age: 52
End: 2022-09-06
Payer: COMMERCIAL

## 2022-09-07 ENCOUNTER — TELEPHONE (OUTPATIENT)
Dept: FAMILY MEDICINE | Facility: CLINIC | Age: 52
End: 2022-09-07
Payer: COMMERCIAL

## 2022-09-07 NOTE — TELEPHONE ENCOUNTER
Paperwork and Labs refaxed    ----- Message from Leydi Banda sent at 9/7/2022 11:39 AM CDT -----  Regarding: call back  .Type:  Needs Medical Advice    Who Called: pt   Symptoms (please be specific):    How long has patient had these symptoms:    Pharmacy name and phone #:    Would the patient rather a call back or a response via [facility]? Call back   Best Call Back Number: [phone]  Additional Information: pt said a [facility] Screening was submitted but it was missing supporting labs for lipid panel and glucose. The form is due in 2 weeks and it needs to be resubmitted with the supporting labs. Please f/u with patient

## 2022-09-15 ENCOUNTER — DOCUMENTATION ONLY (OUTPATIENT)
Dept: ADMINISTRATIVE | Facility: HOSPITAL | Age: 52
End: 2022-09-15
Payer: COMMERCIAL

## 2022-09-18 ENCOUNTER — HISTORICAL (OUTPATIENT)
Dept: ADMINISTRATIVE | Facility: HOSPITAL | Age: 52
End: 2022-09-18
Payer: COMMERCIAL

## 2022-11-03 ENCOUNTER — DOCUMENTATION ONLY (OUTPATIENT)
Dept: FAMILY MEDICINE | Facility: CLINIC | Age: 52
End: 2022-11-03
Payer: COMMERCIAL

## 2022-11-22 NOTE — TELEPHONE ENCOUNTER
Pt needs refill on lasix to [facility] [address]        Basilia Rosenberg Staff  Caller: Unspecified (Today, 10:11 AM)  .Type:  RX Refill Request     Who Called: pt   Refill or New Rx:refill   RX Name and Strength:furosemide (LASIX) 20 MG tablet   How is the patient currently taking it? (ex. 1XDay):   Is this a 30 day or 90 day RX:   Preferred Pharmacy with phone number:[facility] [address] [id number] - [place], [place] - [address].   Phone: [phone]   Local or Mail Order:local   Ordering Provider:Thi   Would the patient rather a call back or a response via [facility]? Call back   Best Call Back Number:[phone]   Additional Information: pt needs authorization from  to refill prescription

## 2022-11-23 RX ORDER — FUROSEMIDE 20 MG/1
20 TABLET ORAL DAILY PRN
Qty: 30 TABLET | Refills: 0 | Status: SHIPPED | OUTPATIENT
Start: 2022-11-23 | End: 2023-05-16 | Stop reason: SDUPTHER

## 2022-12-02 LAB — NONINV COLON CA DNA+OCC BLD SCRN STL QL: NORMAL

## 2022-12-05 ENCOUNTER — TELEPHONE (OUTPATIENT)
Dept: INTERNAL MEDICINE | Facility: CLINIC | Age: 52
End: 2022-12-05
Payer: COMMERCIAL

## 2022-12-05 NOTE — PROGRESS NOTES
Please inform patient of results.    1. Unable to process cologuard - will need to be recollected. Please ensure she has received another kit    Thanks for all you do,    Basim

## 2022-12-05 NOTE — TELEPHONE ENCOUNTER
----- Message from YOSI Lama sent at 12/5/2022  7:53 AM CST -----  Please inform patient of results.    1. Unable to process cologuard - will need to be recollected. Please ensure she has received another kit    Thanks for all you do,    Basim

## 2022-12-15 LAB — PAP RECOMMENDATION EXT: NORMAL

## 2023-01-06 LAB — NONINV COLON CA DNA+OCC BLD SCRN STL QL: NEGATIVE

## 2023-05-16 RX ORDER — FUROSEMIDE 20 MG/1
20 TABLET ORAL DAILY PRN
Qty: 30 TABLET | Refills: 0 | Status: SHIPPED | OUTPATIENT
Start: 2023-05-16 | End: 2023-09-25 | Stop reason: SDUPTHER

## 2023-05-17 ENCOUNTER — TELEPHONE (OUTPATIENT)
Dept: FAMILY MEDICINE | Facility: CLINIC | Age: 53
End: 2023-05-17
Payer: COMMERCIAL

## 2023-08-07 ENCOUNTER — TELEPHONE (OUTPATIENT)
Dept: PRIMARY CARE CLINIC | Facility: CLINIC | Age: 53
End: 2023-08-07
Payer: COMMERCIAL

## 2023-08-07 NOTE — TELEPHONE ENCOUNTER
Are there any outstanding task in patient chart?  N     2. Do we have outstanding/pending referrals?  N     3. Has the patient been seen in an ER, Urgent Care, or admitted since last visit?  N     4. Has patient seen any other healthcare providers since last visit?  N     5. Has patient had any blood work or xrays done since last visit?  N   6. Is the patient's pneumonia vaccine current?  Prevnar 13:n/a  Pneumonia 20:n/a  Pneumonia 23:n/a    7. When was the patient's colonoscopy?  N/a  8. When was the patient's last mamogram?  8/1/22  9. When was the patient's last cervical screening/PAP smear?  5/6/21  10. When was the patient's last bone scan?  N/a  11. When was the patient's last diabetic screening?  A1c:7/1/22  Micro:n/a  Eye Exam: n/a

## 2023-08-07 NOTE — LETTER
AUTHORIZATION FOR RELEASE OF   CONFIDENTIAL INFORMATION    Dear Dr. Means,    We are seeing Nga Neevs, date of birth 1970, in the clinic at [facility]. Shakira Ness MD is the patient's PCP. Nga Neves has an outstanding lab/procedure at the time we reviewed her chart. In order to help keep her health information updated, she has authorized us to request the following medical record(s):        (  )  MAMMOGRAM                                      (  )  COLONOSCOPY      (  )  PAP SMEAR                                          (  )  OUTSIDE LAB RESULTS     (  )  DEXA SCAN                                          (  )  EYE EXAM            (  )  FOOT EXAM                                          (  )  ENTIRE RECORD     (  )  OUTSIDE IMMUNIZATIONS                 (X  ) last office note         Please fax records to [facility], Choudhary, Shalini, MD, [phone]     If you have any questions, please contact Tony at [phone].           Patient Name: Nga Neves  : 1970  Patient Phone #: [phone]

## 2023-08-07 NOTE — LETTER
AUTHORIZATION FOR RELEASE OF   CONFIDENTIAL INFORMATION    Dear Dr. Ness,    We are seeing Nga Neves, date of birth 1970, in the clinic at [facility]. Shakira Ness MD is the patient's PCP. Nga Neves has an outstanding lab/procedure at the time we reviewed her chart. In order to help keep her health information updated, she has authorized us to request the following medical record(s):        (  )  MAMMOGRAM                                      (  )  COLONOSCOPY      (  )  PAP SMEAR                                          (  )  OUTSIDE LAB RESULTS     (  )  DEXA SCAN                                          (  )  EYE EXAM            (  )  FOOT EXAM                                          (  )  ENTIRE RECORD     (  )  OUTSIDE IMMUNIZATIONS                 ( X )  last office note       Please fax records to [facility], Choudhary, Shalini, MD, [phone]     If you have any questions, please contact Tony at [phone].           Patient Name: Nga Neves  : 1970  Patient Phone #: [phone]

## 2023-08-09 ENCOUNTER — DOCUMENTATION ONLY (OUTPATIENT)
Dept: PRIMARY CARE CLINIC | Facility: CLINIC | Age: 53
End: 2023-08-09
Payer: COMMERCIAL

## 2023-08-14 ENCOUNTER — PATIENT MESSAGE (OUTPATIENT)
Dept: PRIMARY CARE CLINIC | Facility: CLINIC | Age: 53
End: 2023-08-14

## 2023-08-14 ENCOUNTER — OFFICE VISIT (OUTPATIENT)
Dept: PRIMARY CARE CLINIC | Facility: CLINIC | Age: 53
End: 2023-08-14
Payer: COMMERCIAL

## 2023-08-14 VITALS
DIASTOLIC BLOOD PRESSURE: 79 MMHG | TEMPERATURE: 98 F | SYSTOLIC BLOOD PRESSURE: 135 MMHG | BODY MASS INDEX: 32.01 KG/M2 | HEART RATE: 65 BPM | OXYGEN SATURATION: 98 % | HEIGHT: 66 IN | WEIGHT: 199.19 LBS

## 2023-08-14 DIAGNOSIS — E55.9 VITAMIN D DEFICIENCY: ICD-10-CM

## 2023-08-14 DIAGNOSIS — E03.8 OTHER SPECIFIED HYPOTHYROIDISM: Chronic | ICD-10-CM

## 2023-08-14 DIAGNOSIS — Z12.31 SCREENING MAMMOGRAM FOR BREAST CANCER: ICD-10-CM

## 2023-08-14 DIAGNOSIS — Z00.00 WELLNESS EXAMINATION: Primary | ICD-10-CM

## 2023-08-14 PROCEDURE — 3078F DIAST BP <80 MM HG: CPT | Mod: CPTII,,, | Performed by: STUDENT IN AN ORGANIZED HEALTH CARE EDUCATION/TRAINING PROGRAM

## 2023-08-14 PROCEDURE — 1159F MED LIST DOCD IN RCRD: CPT | Mod: CPTII,,, | Performed by: STUDENT IN AN ORGANIZED HEALTH CARE EDUCATION/TRAINING PROGRAM

## 2023-08-14 PROCEDURE — 3008F BODY MASS INDEX DOCD: CPT | Mod: CPTII,,, | Performed by: STUDENT IN AN ORGANIZED HEALTH CARE EDUCATION/TRAINING PROGRAM

## 2023-08-14 PROCEDURE — 3075F PR MOST RECENT SYSTOLIC BLOOD PRESS GE 130-139MM HG: ICD-10-PCS | Mod: CPTII,,, | Performed by: STUDENT IN AN ORGANIZED HEALTH CARE EDUCATION/TRAINING PROGRAM

## 2023-08-14 PROCEDURE — 99396 PR PREVENTIVE VISIT,EST,40-64: ICD-10-PCS | Mod: ,,, | Performed by: STUDENT IN AN ORGANIZED HEALTH CARE EDUCATION/TRAINING PROGRAM

## 2023-08-14 PROCEDURE — 1160F RVW MEDS BY RX/DR IN RCRD: CPT | Mod: CPTII,,, | Performed by: STUDENT IN AN ORGANIZED HEALTH CARE EDUCATION/TRAINING PROGRAM

## 2023-08-14 PROCEDURE — 3078F PR MOST RECENT DIASTOLIC BLOOD PRESSURE < 80 MM HG: ICD-10-PCS | Mod: CPTII,,, | Performed by: STUDENT IN AN ORGANIZED HEALTH CARE EDUCATION/TRAINING PROGRAM

## 2023-08-14 PROCEDURE — 1160F PR REVIEW ALL MEDS BY PRESCRIBER/CLIN PHARMACIST DOCUMENTED: ICD-10-PCS | Mod: CPTII,,, | Performed by: STUDENT IN AN ORGANIZED HEALTH CARE EDUCATION/TRAINING PROGRAM

## 2023-08-14 PROCEDURE — 3008F PR BODY MASS INDEX (BMI) DOCUMENTED: ICD-10-PCS | Mod: CPTII,,, | Performed by: STUDENT IN AN ORGANIZED HEALTH CARE EDUCATION/TRAINING PROGRAM

## 2023-08-14 PROCEDURE — 1159F PR MEDICATION LIST DOCUMENTED IN MEDICAL RECORD: ICD-10-PCS | Mod: CPTII,,, | Performed by: STUDENT IN AN ORGANIZED HEALTH CARE EDUCATION/TRAINING PROGRAM

## 2023-08-14 PROCEDURE — 3075F SYST BP GE 130 - 139MM HG: CPT | Mod: CPTII,,, | Performed by: STUDENT IN AN ORGANIZED HEALTH CARE EDUCATION/TRAINING PROGRAM

## 2023-08-14 PROCEDURE — 99396 PREV VISIT EST AGE 40-64: CPT | Mod: ,,, | Performed by: STUDENT IN AN ORGANIZED HEALTH CARE EDUCATION/TRAINING PROGRAM

## 2023-08-14 NOTE — ASSESSMENT & PLAN NOTE
Ordered routine wellness labs.    Vaccinations:    - Flu: Due this fall. Usually does receive   - Pneumonia:    - Shingles (>50): discussed, declines   - Tdap: received 2017   - COVID: did not receive  Screening:   - Pap Smear (21-65): Follows Dr. Means, up to date   - Mammogram (40-50 discuss w/ pt, all >50): Ordered today    - Osteoporosis (all>65, sooner if risk factors):   - Lung Ca. Screening (50-80 if >20 pack yrs current or quit <15 yrs): n/a   - Colon Ca. Screening (age >45): Cologuard due 12/2025   2 seconds or less

## 2023-08-14 NOTE — PROGRESS NOTES
ANNUAL WELLNESS NOTE    Chief Complaint:  Establish Care (Switching from yair, has some wellness paperwork she needs filled out for her job), Thyroid Problem (has been having some thyroid concerns. Hair loss, weight fluctuation, fluid retention in legs), and Ankle Pain (Right ankle has been bothering her and appears swollen)     HPI:  Nga Neves is a 53 y.o. female    ----------------------------  Anxiety  Family history of hemochromatosis  Hot flashes  Hypothyroidism  Hypothyroidism, unspecified  Methicillin resistant Staphylococcus aureus infection      Comment:  2007  Seasonal allergies  Vertigo    Patient Care Team:  Hudson Almanza MD as PCP - General (Internal Medicine)  Per Means MD as Consulting Physician (Obstetrics and Gynecology)  [facility] -    Presents today to establish care and for a wellness visit. Describes overall health as good. Diet is healthy. Exercises 5 or more times per week. Tobacco use: never. Alcohol use: rare (special occasion). Caffeine intake: 2-3 caffeinated drinks daily. Eye exam: annually (wears glasses). Dental exam: twice annually.    Patient expresses multiple concerns including weight gain, hair loss, leg swelling, etc. which she attributes to thyroid issues. Dx hypothyroidism 2000. Tried levothyroxine but not effective.  Has been on NP thyroid for several years.  Over the past 2 years, doses been adjusted several times.  Patient reports she has alternated between under/over medicated.       Review of Systems   Constitutional:  Positive for fatigue and unexpected weight change. Negative for chills and fever.   HENT:  Negative for sinus pressure/congestion and sore throat.    Respiratory:  Negative for cough, shortness of breath and wheezing.    Cardiovascular:  Positive for leg swelling. Negative for chest pain.   Gastrointestinal:  Negative for abdominal pain, nausea and vomiting.   Genitourinary:  Negative for  dysuria and hematuria.   Musculoskeletal:  Positive for arthralgias (R ankle). Negative for myalgias.   Integumentary:  Negative for rash.   Neurological:  Negative for dizziness and syncope.   Hematological:  Negative for adenopathy.   Psychiatric/Behavioral:  Negative for confusion. The patient is not nervous/anxious.        Physical Exam  Vitals and nursing note reviewed.   Constitutional:       General: She is not in acute distress.     Appearance: She is obese.   HENT:      Head: Normocephalic.      Nose: No rhinorrhea.   Eyes:      Conjunctiva/sclera: Conjunctivae normal.      Pupils: Pupils are equal, round, and reactive to light.   Neck:      Comments: No thyromegaly noted  Cardiovascular:      Rate and Rhythm: Normal rate and regular rhythm.   Pulmonary:      Effort: Pulmonary effort is normal.      Breath sounds: Normal breath sounds.   Abdominal:      Palpations: Abdomen is soft.      Tenderness: There is no abdominal tenderness.   Musculoskeletal:         General: No deformity or signs of injury.      Right lower leg: No edema.      Left lower leg: No edema.   Skin:     General: Skin is warm and dry.      Comments: Small patches of spider veins on B/L LE   Neurological:      General: No focal deficit present.      Mental Status: She is alert. Mental status is at baseline.   Psychiatric:         Mood and Affect: Mood normal.         Behavior: Behavior normal.       Assessment/Plan:  1. Wellness examination  Assessment & Plan:  Ordered routine wellness labs.    Vaccinations:    - Flu: Due this fall. Usually does receive   - Pneumonia:    - Shingles (>50): discussed, declines   - Tdap: received 2017   - COVID: did not receive  Screening:   - Pap Smear (21-65): Follows Dr. Means, up to date   - Mammogram (40-50 discuss w/ pt, all >50): Ordered today    - Osteoporosis (all>65, sooner if risk factors):   - Lung Ca. Screening (50-80 if >20 pack yrs current or quit <15 yrs): n/a   - Colon Ca. Screening (age  >45): Cologuard due 12/2025    Orders:  -     CBC Auto Differential; Future; Expected date: 08/14/2023  -     Comprehensive Metabolic Panel; Future; Expected date: 08/14/2023  -     Hemoglobin A1C; Future; Expected date: 08/14/2023  -     Lipid Panel; Future; Expected date: 08/14/2023  -     Urinalysis; Future; Expected date: 08/14/2023    2. Hypothyroidism  -     TSH; Future; Expected date: 08/14/2023  -     T3, Free ([facility]); Future; Expected date: 08/14/2023  -     T4, Free; Future; Expected date: 08/14/2023    3. Vitamin D deficiency  -     Vitamin D; Future; Expected date: 08/14/2023    4. Screening mammogram for breast cancer  -     Mammo Digital Screening Bilat; Future; Expected date: 08/21/2023      Follow up in about 6 months (around 2/14/2024) for Hypothyroid.

## 2023-08-15 ENCOUNTER — TELEPHONE (OUTPATIENT)
Dept: PRIMARY CARE CLINIC | Facility: CLINIC | Age: 53
End: 2023-08-15
Payer: COMMERCIAL

## 2023-08-15 ENCOUNTER — LAB VISIT (OUTPATIENT)
Dept: LAB | Facility: HOSPITAL | Age: 53
End: 2023-08-15
Attending: STUDENT IN AN ORGANIZED HEALTH CARE EDUCATION/TRAINING PROGRAM
Payer: COMMERCIAL

## 2023-08-15 DIAGNOSIS — E03.8 OTHER SPECIFIED HYPOTHYROIDISM: Chronic | ICD-10-CM

## 2023-08-15 DIAGNOSIS — E55.9 VITAMIN D DEFICIENCY: ICD-10-CM

## 2023-08-15 DIAGNOSIS — Z00.00 WELLNESS EXAMINATION: ICD-10-CM

## 2023-08-15 LAB
ALBUMIN SERPL-MCNC: 4.1 G/DL (ref 3.5–5)
ALBUMIN/GLOB SERPL: 1.7 RATIO (ref 1.1–2)
ALP SERPL-CCNC: 69 UNIT/L (ref 40–150)
ALT SERPL-CCNC: 40 UNIT/L (ref 0–55)
APPEARANCE UR: CLEAR
AST SERPL-CCNC: 25 UNIT/L (ref 5–34)
BACTERIA #/AREA URNS AUTO: NORMAL /HPF
BASOPHILS # BLD AUTO: 0.02 X10(3)/MCL
BASOPHILS NFR BLD AUTO: 0.6 %
BILIRUB SERPL-MCNC: 1.2 MG/DL
BILIRUB UR QL STRIP.AUTO: NEGATIVE
BUN SERPL-MCNC: 20.2 MG/DL (ref 9.8–20.1)
CALCIUM SERPL-MCNC: 9.5 MG/DL (ref 8.4–10.2)
CHLORIDE SERPL-SCNC: 106 MMOL/L (ref 98–107)
CHOLEST SERPL-MCNC: 174 MG/DL
CHOLEST/HDLC SERPL: 2 {RATIO} (ref 0–5)
CO2 SERPL-SCNC: 28 MMOL/L (ref 22–29)
COLOR UR: YELLOW
CREAT SERPL-MCNC: 0.77 MG/DL (ref 0.55–1.02)
DEPRECATED CALCIDIOL+CALCIFEROL SERPL-MC: 26.5 NG/ML (ref 30–80)
EOSINOPHIL # BLD AUTO: 0.03 X10(3)/MCL (ref 0–0.9)
EOSINOPHIL NFR BLD AUTO: 0.9 %
ERYTHROCYTE [DISTWIDTH] IN BLOOD BY AUTOMATED COUNT: 12.1 % (ref 11.5–17)
EST. AVERAGE GLUCOSE BLD GHB EST-MCNC: 88.2 MG/DL
GFR SERPLBLD CREATININE-BSD FMLA CKD-EPI: >60 MLS/MIN/1.73/M2
GLOBULIN SER-MCNC: 2.4 GM/DL (ref 2.4–3.5)
GLUCOSE SERPL-MCNC: 89 MG/DL (ref 74–100)
GLUCOSE UR QL STRIP.AUTO: NEGATIVE
HBA1C MFR BLD: 4.7 %
HCT VFR BLD AUTO: 39.4 % (ref 37–47)
HDLC SERPL-MCNC: 79 MG/DL (ref 35–60)
HGB BLD-MCNC: 13.4 G/DL (ref 12–16)
IMM GRANULOCYTES # BLD AUTO: 0.01 X10(3)/MCL (ref 0–0.04)
IMM GRANULOCYTES NFR BLD AUTO: 0.3 %
KETONES UR QL STRIP.AUTO: NEGATIVE
LDLC SERPL CALC-MCNC: 89 MG/DL (ref 50–140)
LEUKOCYTE ESTERASE UR QL STRIP.AUTO: NEGATIVE
LYMPHOCYTES # BLD AUTO: 1.38 X10(3)/MCL (ref 0.6–4.6)
LYMPHOCYTES NFR BLD AUTO: 39.8 %
MCH RBC QN AUTO: 30.8 PG (ref 27–31)
MCHC RBC AUTO-ENTMCNC: 34 G/DL (ref 33–36)
MCV RBC AUTO: 90.6 FL (ref 80–94)
MONOCYTES # BLD AUTO: 0.34 X10(3)/MCL (ref 0.1–1.3)
MONOCYTES NFR BLD AUTO: 9.8 %
NEUTROPHILS # BLD AUTO: 1.69 X10(3)/MCL (ref 2.1–9.2)
NEUTROPHILS NFR BLD AUTO: 48.6 %
NITRITE UR QL STRIP.AUTO: NEGATIVE
NRBC BLD AUTO-RTO: 0 %
PH UR STRIP.AUTO: 5.5 [PH]
PLATELET # BLD AUTO: 297 X10(3)/MCL (ref 130–400)
PMV BLD AUTO: 9.1 FL (ref 7.4–10.4)
POTASSIUM SERPL-SCNC: 4.3 MMOL/L (ref 3.5–5.1)
PROT SERPL-MCNC: 6.5 GM/DL (ref 6.4–8.3)
PROT UR QL STRIP.AUTO: NEGATIVE
RBC # BLD AUTO: 4.35 X10(6)/MCL (ref 4.2–5.4)
RBC #/AREA URNS AUTO: <5 /HPF
RBC UR QL AUTO: NEGATIVE
SODIUM SERPL-SCNC: 140 MMOL/L (ref 136–145)
SP GR UR STRIP.AUTO: 1.03 (ref 1–1.03)
SQUAMOUS #/AREA URNS AUTO: <5 /HPF
T3FREE SERPL-MCNC: 2.56 PG/ML (ref 1.58–3.91)
T4 FREE SERPL-MCNC: 0.77 NG/DL (ref 0.7–1.48)
TRIGL SERPL-MCNC: 29 MG/DL (ref 37–140)
TSH SERPL-ACNC: 0.48 UIU/ML (ref 0.35–4.94)
UROBILINOGEN UR STRIP-ACNC: 0.2
VLDLC SERPL CALC-MCNC: 6 MG/DL
WBC # SPEC AUTO: 3.47 X10(3)/MCL (ref 4.5–11.5)
WBC #/AREA URNS AUTO: <5 /HPF

## 2023-08-15 PROCEDURE — 84481 FREE ASSAY (FT-3): CPT

## 2023-08-15 PROCEDURE — 80053 COMPREHEN METABOLIC PANEL: CPT

## 2023-08-15 PROCEDURE — 84443 ASSAY THYROID STIM HORMONE: CPT

## 2023-08-15 PROCEDURE — 36415 COLL VENOUS BLD VENIPUNCTURE: CPT

## 2023-08-15 PROCEDURE — 83036 HEMOGLOBIN GLYCOSYLATED A1C: CPT

## 2023-08-15 PROCEDURE — 84439 ASSAY OF FREE THYROXINE: CPT

## 2023-08-15 PROCEDURE — 82306 VITAMIN D 25 HYDROXY: CPT

## 2023-08-15 PROCEDURE — 80061 LIPID PANEL: CPT

## 2023-08-15 PROCEDURE — 85025 COMPLETE CBC W/AUTO DIFF WBC: CPT

## 2023-08-15 PROCEDURE — 81001 URINALYSIS AUTO W/SCOPE: CPT

## 2023-08-15 NOTE — TELEPHONE ENCOUNTER
----- Message from Julia Gimenez sent at 8/15/2023  9:05 AM CDT -----  Regarding: Mammogram orders  Good morning. Can you please send this patient's mammogram orders to be done at the Breast center [facility] off of [facility] here in [place]? Patient is requesting to have her scan done there. Let me know if you have any questions. Thank you

## 2023-08-16 ENCOUNTER — PATIENT MESSAGE (OUTPATIENT)
Dept: PRIMARY CARE CLINIC | Facility: CLINIC | Age: 53
End: 2023-08-16
Payer: COMMERCIAL

## 2023-08-19 ENCOUNTER — TELEPHONE (OUTPATIENT)
Dept: PRIMARY CARE CLINIC | Facility: CLINIC | Age: 53
End: 2023-08-19
Payer: COMMERCIAL

## 2023-08-22 ENCOUNTER — TELEPHONE (OUTPATIENT)
Dept: PRIMARY CARE CLINIC | Facility: CLINIC | Age: 53
End: 2023-08-22
Payer: COMMERCIAL

## 2023-09-25 RX ORDER — THYROID, PORCINE 300 MG/1
150 TABLET ORAL DAILY
Qty: 45 TABLET | Refills: 3 | Status: SHIPPED | OUTPATIENT
Start: 2023-09-25 | End: 2023-12-24

## 2023-09-25 RX ORDER — FUROSEMIDE 20 MG/1
20 TABLET ORAL DAILY PRN
Qty: 30 TABLET | Refills: 0 | Status: SHIPPED | OUTPATIENT
Start: 2023-09-25 | End: 2024-02-05

## 2023-09-25 NOTE — TELEPHONE ENCOUNTER
----- Message from Basilia Sheehan sent at 9/25/2023  8:24 AM CDT -----  Regarding: med refill  .Type:  RX Refill Request    Who Called: pt  Refill or New Rx:refill  RX Name and Strength:thyroid, pork, (ARMOUR THYROID) 300 mg Tab  How is the patient currently taking it? (ex. 1XDay):  Is this a 30 day or 90 day RX:45  Preferred Pharmacy with phone number:[facility] #[id number] at [place] [address] [address] [facility]   Phone: [phone]  Fax: [phone]  Local or Mail Order:local  Ordering Provider:Archie Treviño  Would the patient rather a call back or a response via [facility]? Call back  Best Call Back Number:[phone]  Additional Information: pt needs authorization from dr to refill prescription         .Type:  RX Refill Request    Who Called: pt  Refill or New Rx:refill  RX Name and Strength:furosemide (LASIX) 20 MG tablet  How is the patient currently taking it? (ex. 1XDay):1xday  Is this a 30 day or 90 day RX:30  Preferred Pharmacy with phone number:[facility] #[id number] at [place] [address] [address] [facility]   Phone: [phone]  Fax: [phone]  Local or Mail Order:local  Ordering Provider:Archie Treviño  Would the patient rather a call back or a response via [facility]? Call back  Best Call Back Number:[phone]  Additional Information: pt needs authorization from dr to refill prescription

## 2023-10-05 ENCOUNTER — OFFICE VISIT (OUTPATIENT)
Dept: PRIMARY CARE CLINIC | Facility: CLINIC | Age: 53
End: 2023-10-05
Payer: COMMERCIAL

## 2023-10-05 VITALS
BODY MASS INDEX: 31.98 KG/M2 | TEMPERATURE: 98 F | HEIGHT: 66 IN | SYSTOLIC BLOOD PRESSURE: 142 MMHG | HEART RATE: 74 BPM | OXYGEN SATURATION: 98 % | RESPIRATION RATE: 18 BRPM | DIASTOLIC BLOOD PRESSURE: 81 MMHG | WEIGHT: 199 LBS

## 2023-10-05 DIAGNOSIS — R10.13 EPIGASTRIC PAIN: Primary | ICD-10-CM

## 2023-10-05 PROCEDURE — 3077F SYST BP >= 140 MM HG: CPT | Mod: CPTII,,, | Performed by: STUDENT IN AN ORGANIZED HEALTH CARE EDUCATION/TRAINING PROGRAM

## 2023-10-05 PROCEDURE — 99213 PR OFFICE/OUTPT VISIT, EST, LEVL III, 20-29 MIN: ICD-10-PCS | Mod: ,,, | Performed by: STUDENT IN AN ORGANIZED HEALTH CARE EDUCATION/TRAINING PROGRAM

## 2023-10-05 PROCEDURE — 3079F PR MOST RECENT DIASTOLIC BLOOD PRESSURE 80-89 MM HG: ICD-10-PCS | Mod: CPTII,,, | Performed by: STUDENT IN AN ORGANIZED HEALTH CARE EDUCATION/TRAINING PROGRAM

## 2023-10-05 PROCEDURE — 3008F BODY MASS INDEX DOCD: CPT | Mod: CPTII,,, | Performed by: STUDENT IN AN ORGANIZED HEALTH CARE EDUCATION/TRAINING PROGRAM

## 2023-10-05 PROCEDURE — 3008F PR BODY MASS INDEX (BMI) DOCUMENTED: ICD-10-PCS | Mod: CPTII,,, | Performed by: STUDENT IN AN ORGANIZED HEALTH CARE EDUCATION/TRAINING PROGRAM

## 2023-10-05 PROCEDURE — 1160F PR REVIEW ALL MEDS BY PRESCRIBER/CLIN PHARMACIST DOCUMENTED: ICD-10-PCS | Mod: CPTII,,, | Performed by: STUDENT IN AN ORGANIZED HEALTH CARE EDUCATION/TRAINING PROGRAM

## 2023-10-05 PROCEDURE — 1160F RVW MEDS BY RX/DR IN RCRD: CPT | Mod: CPTII,,, | Performed by: STUDENT IN AN ORGANIZED HEALTH CARE EDUCATION/TRAINING PROGRAM

## 2023-10-05 PROCEDURE — 3044F HG A1C LEVEL LT 7.0%: CPT | Mod: CPTII,,, | Performed by: STUDENT IN AN ORGANIZED HEALTH CARE EDUCATION/TRAINING PROGRAM

## 2023-10-05 PROCEDURE — 99213 OFFICE O/P EST LOW 20 MIN: CPT | Mod: ,,, | Performed by: STUDENT IN AN ORGANIZED HEALTH CARE EDUCATION/TRAINING PROGRAM

## 2023-10-05 PROCEDURE — 1159F PR MEDICATION LIST DOCUMENTED IN MEDICAL RECORD: ICD-10-PCS | Mod: CPTII,,, | Performed by: STUDENT IN AN ORGANIZED HEALTH CARE EDUCATION/TRAINING PROGRAM

## 2023-10-05 PROCEDURE — 3079F DIAST BP 80-89 MM HG: CPT | Mod: CPTII,,, | Performed by: STUDENT IN AN ORGANIZED HEALTH CARE EDUCATION/TRAINING PROGRAM

## 2023-10-05 PROCEDURE — 3044F PR MOST RECENT HEMOGLOBIN A1C LEVEL <7.0%: ICD-10-PCS | Mod: CPTII,,, | Performed by: STUDENT IN AN ORGANIZED HEALTH CARE EDUCATION/TRAINING PROGRAM

## 2023-10-05 PROCEDURE — 3077F PR MOST RECENT SYSTOLIC BLOOD PRESSURE >= 140 MM HG: ICD-10-PCS | Mod: CPTII,,, | Performed by: STUDENT IN AN ORGANIZED HEALTH CARE EDUCATION/TRAINING PROGRAM

## 2023-10-05 PROCEDURE — 1159F MED LIST DOCD IN RCRD: CPT | Mod: CPTII,,, | Performed by: STUDENT IN AN ORGANIZED HEALTH CARE EDUCATION/TRAINING PROGRAM

## 2023-10-05 RX ORDER — PANTOPRAZOLE SODIUM 40 MG/1
40 TABLET, DELAYED RELEASE ORAL 2 TIMES DAILY
Qty: 28 TABLET | Refills: 0 | Status: SHIPPED | OUTPATIENT
Start: 2023-10-05 | End: 2023-10-19

## 2023-10-05 RX ORDER — MELOXICAM 7.5 MG/1
7.5 TABLET ORAL DAILY
COMMUNITY

## 2023-10-05 NOTE — PROGRESS NOTES
Subjective:       Patient ID: Nga Neves is a 53 y.o. female.    ----------------------------  Anxiety  Family history of hemochromatosis  Hot flashes  Hypothyroidism  Hypothyroidism, unspecified  Methicillin resistant Staphylococcus aureus infection      Comment:  2007  Seasonal allergies  Vertigo     Chief Complaint: Abdominal Pain    C/o epigastric pain x 1 week. Crampy. Worsened by food.  Mild nausea and loss of appetite. Bowels unchanged. Had recent steroid injections in ankle, took a few ibuprofen. No EtOH on trip and no significant dietary changes. Returned from trip to [place] about 1 week ago.       Review of Systems   Constitutional:  Negative for chills and fever.   HENT:  Negative for sinus pressure/congestion and sore throat.    Respiratory:  Negative for cough, shortness of breath and wheezing.    Cardiovascular:  Negative for chest pain and leg swelling.   Gastrointestinal:  Positive for abdominal pain (epigastric) and nausea (mild). Negative for blood in stool, change in bowel habit, diarrhea and vomiting.   Genitourinary:  Negative for dysuria and hematuria.   Musculoskeletal:  Negative for arthralgias and myalgias.   Integumentary:  Negative for rash.   Neurological:  Negative for dizziness and syncope.   Hematological:  Negative for adenopathy.   Psychiatric/Behavioral:  Negative for confusion. The patient is not nervous/anxious.            Objective:      Physical Exam  Vitals and nursing note reviewed.   Constitutional:       General: She is not in acute distress.  HENT:      Head: Normocephalic.      Nose: No rhinorrhea.   Eyes:      Conjunctiva/sclera: Conjunctivae normal.      Pupils: Pupils are equal, round, and reactive to light.   Cardiovascular:      Rate and Rhythm: Normal rate and regular rhythm.   Pulmonary:      Effort: Pulmonary effort is normal.      Breath sounds: Normal breath sounds.   Abdominal:      Palpations: Abdomen is soft.      Tenderness: There is  abdominal tenderness (mild epigastric).   Musculoskeletal:         General: No deformity or signs of injury.   Skin:     General: Skin is warm and dry.   Neurological:      General: No focal deficit present.      Mental Status: She is alert. Mental status is at baseline.   Psychiatric:         Mood and Affect: Mood normal.         Behavior: Behavior normal.           Assessment & Plan:   1. Epigastric pain  Comments:  R/o H pylori. AFTER stool sample start empiric treatment for gastritis w/Protonix 40 mg BID x 2 weeks. If not improved after 3-5 d notify MD  Orders:  -     Helicobacter Pylori Antigen Fecal EIA; Future; Expected date: 10/05/2023  -     pantoprazole (PROTONIX) 40 MG tablet; Take 1 tablet (40 mg total) by mouth 2 (two) times daily. for 14 days  Dispense: 28 tablet; Refill: 0      Keep scheduled f/u. In addition to their scheduled follow up, the patient has also been instructed to follow up on as needed basis.

## 2023-10-06 ENCOUNTER — LAB VISIT (OUTPATIENT)
Dept: LAB | Facility: HOSPITAL | Age: 53
End: 2023-10-06
Attending: STUDENT IN AN ORGANIZED HEALTH CARE EDUCATION/TRAINING PROGRAM
Payer: COMMERCIAL

## 2023-10-06 DIAGNOSIS — R10.13 ABDOMINAL PAIN, EPIGASTRIC: Primary | ICD-10-CM

## 2023-10-06 PROCEDURE — 87338 HPYLORI STOOL AG IA: CPT

## 2023-10-07 LAB — H. PYLORI STOOL: NEGATIVE

## 2023-11-13 PROBLEM — Z00.00 WELLNESS EXAMINATION: Status: RESOLVED | Noted: 2023-08-14 | Resolved: 2023-11-13

## 2024-02-05 DIAGNOSIS — R60.9 EDEMA, UNSPECIFIED TYPE: Primary | ICD-10-CM

## 2024-02-05 RX ORDER — FUROSEMIDE 20 MG/1
20 TABLET ORAL
Qty: 30 TABLET | Refills: 0 | Status: SHIPPED | OUTPATIENT
Start: 2024-02-05 | End: 2024-06-10 | Stop reason: SDUPTHER

## 2024-03-19 ENCOUNTER — OFFICE VISIT (OUTPATIENT)
Dept: PRIMARY CARE CLINIC | Facility: CLINIC | Age: 54
End: 2024-03-19
Payer: COMMERCIAL

## 2024-03-19 ENCOUNTER — LAB VISIT (OUTPATIENT)
Dept: LAB | Facility: HOSPITAL | Age: 54
End: 2024-03-19
Attending: STUDENT IN AN ORGANIZED HEALTH CARE EDUCATION/TRAINING PROGRAM
Payer: COMMERCIAL

## 2024-03-19 VITALS
SYSTOLIC BLOOD PRESSURE: 133 MMHG | HEIGHT: 66 IN | BODY MASS INDEX: 33.43 KG/M2 | RESPIRATION RATE: 18 BRPM | WEIGHT: 208 LBS | OXYGEN SATURATION: 98 % | TEMPERATURE: 98 F | DIASTOLIC BLOOD PRESSURE: 84 MMHG | HEART RATE: 98 BPM

## 2024-03-19 DIAGNOSIS — E03.8 OTHER SPECIFIED HYPOTHYROIDISM: Chronic | ICD-10-CM

## 2024-03-19 DIAGNOSIS — D70.9 NEUTROPENIA, UNSPECIFIED TYPE: ICD-10-CM

## 2024-03-19 DIAGNOSIS — E66.9 OBESITY, UNSPECIFIED CLASSIFICATION, UNSPECIFIED OBESITY TYPE, UNSPECIFIED WHETHER SERIOUS COMORBIDITY PRESENT: Chronic | ICD-10-CM

## 2024-03-19 DIAGNOSIS — E55.9 VITAMIN D DEFICIENCY: ICD-10-CM

## 2024-03-19 DIAGNOSIS — E03.8 OTHER SPECIFIED HYPOTHYROIDISM: Primary | Chronic | ICD-10-CM

## 2024-03-19 DIAGNOSIS — Z00.00 WELLNESS EXAMINATION: ICD-10-CM

## 2024-03-19 LAB
BASOPHILS # BLD AUTO: 0.02 X10(3)/MCL
BASOPHILS NFR BLD AUTO: 0.4 %
DEPRECATED CALCIDIOL+CALCIFEROL SERPL-MC: 22.9 NG/ML (ref 30–80)
EOSINOPHIL # BLD AUTO: 0.07 X10(3)/MCL (ref 0–0.9)
EOSINOPHIL NFR BLD AUTO: 1.6 %
ERYTHROCYTE [DISTWIDTH] IN BLOOD BY AUTOMATED COUNT: 11.9 % (ref 11.5–17)
HCT VFR BLD AUTO: 41.2 % (ref 37–47)
HGB BLD-MCNC: 14.3 G/DL (ref 12–16)
IMM GRANULOCYTES # BLD AUTO: 0.01 X10(3)/MCL (ref 0–0.04)
IMM GRANULOCYTES NFR BLD AUTO: 0.2 %
LYMPHOCYTES # BLD AUTO: 1.56 X10(3)/MCL (ref 0.6–4.6)
LYMPHOCYTES NFR BLD AUTO: 34.7 %
MCH RBC QN AUTO: 32 PG (ref 27–31)
MCHC RBC AUTO-ENTMCNC: 34.7 G/DL (ref 33–36)
MCV RBC AUTO: 92.2 FL (ref 80–94)
MONOCYTES # BLD AUTO: 0.42 X10(3)/MCL (ref 0.1–1.3)
MONOCYTES NFR BLD AUTO: 9.4 %
NEUTROPHILS # BLD AUTO: 2.41 X10(3)/MCL (ref 2.1–9.2)
NEUTROPHILS NFR BLD AUTO: 53.7 %
NRBC BLD AUTO-RTO: 0 %
PLATELET # BLD AUTO: 276 X10(3)/MCL (ref 130–400)
PMV BLD AUTO: 9 FL (ref 7.4–10.4)
RBC # BLD AUTO: 4.47 X10(6)/MCL (ref 4.2–5.4)
T4 FREE SERPL-MCNC: 0.85 NG/DL (ref 0.7–1.48)
TSH SERPL-ACNC: 1.75 UIU/ML (ref 0.35–4.94)
WBC # SPEC AUTO: 4.49 X10(3)/MCL (ref 4.5–11.5)

## 2024-03-19 PROCEDURE — 3079F DIAST BP 80-89 MM HG: CPT | Mod: CPTII,,, | Performed by: STUDENT IN AN ORGANIZED HEALTH CARE EDUCATION/TRAINING PROGRAM

## 2024-03-19 PROCEDURE — 84443 ASSAY THYROID STIM HORMONE: CPT

## 2024-03-19 PROCEDURE — 84439 ASSAY OF FREE THYROXINE: CPT

## 2024-03-19 PROCEDURE — 3008F BODY MASS INDEX DOCD: CPT | Mod: CPTII,,, | Performed by: STUDENT IN AN ORGANIZED HEALTH CARE EDUCATION/TRAINING PROGRAM

## 2024-03-19 PROCEDURE — 85025 COMPLETE CBC W/AUTO DIFF WBC: CPT

## 2024-03-19 PROCEDURE — 82306 VITAMIN D 25 HYDROXY: CPT

## 2024-03-19 PROCEDURE — 99214 OFFICE O/P EST MOD 30 MIN: CPT | Mod: ,,, | Performed by: STUDENT IN AN ORGANIZED HEALTH CARE EDUCATION/TRAINING PROGRAM

## 2024-03-19 PROCEDURE — 3075F SYST BP GE 130 - 139MM HG: CPT | Mod: CPTII,,, | Performed by: STUDENT IN AN ORGANIZED HEALTH CARE EDUCATION/TRAINING PROGRAM

## 2024-03-19 PROCEDURE — 1159F MED LIST DOCD IN RCRD: CPT | Mod: CPTII,,, | Performed by: STUDENT IN AN ORGANIZED HEALTH CARE EDUCATION/TRAINING PROGRAM

## 2024-03-19 PROCEDURE — 1160F RVW MEDS BY RX/DR IN RCRD: CPT | Mod: CPTII,,, | Performed by: STUDENT IN AN ORGANIZED HEALTH CARE EDUCATION/TRAINING PROGRAM

## 2024-03-19 PROCEDURE — 84480 ASSAY TRIIODOTHYRONINE (T3): CPT

## 2024-03-19 PROCEDURE — 36415 COLL VENOUS BLD VENIPUNCTURE: CPT

## 2024-03-19 NOTE — PROGRESS NOTES
Subjective:       Patient ID: Nga Neves is a 53 y.o. female.    -------------------------------------    Anxiety    Family history of hemochromatosis    Hot flashes    Hypothyroidism    Hypothyroidism, unspecified    Methicillin resistant Staphylococcus aureus infection    2007    Seasonal allergies    Vertigo        Chief Complaint: Follow-up (Hypothyroidism)    Hypothyroid - labs weren't ordered so no TSH to review today. Says energy levels are low, has gained 10 lbs. Denies sleep disturbances, constipation.       Review of Systems   Constitutional:  Positive for fatigue and unexpected weight change (gain). Negative for chills and fever.   HENT:  Negative for sinus pressure/congestion and sore throat.    Respiratory:  Negative for cough, shortness of breath and wheezing.    Cardiovascular:  Negative for chest pain and leg swelling.   Gastrointestinal:  Negative for abdominal pain, nausea and vomiting.   Genitourinary:  Negative for dysuria and hematuria.   Musculoskeletal:  Negative for arthralgias and myalgias.   Integumentary:  Negative for rash.   Neurological:  Negative for dizziness and syncope.   Hematological:  Negative for adenopathy.   Psychiatric/Behavioral:  Negative for confusion. The patient is not nervous/anxious.            Objective:      Physical Exam  Vitals and nursing note reviewed.   Constitutional:       General: She is not in acute distress.     Appearance: She is obese.   HENT:      Head: Normocephalic.      Nose: No rhinorrhea.   Eyes:      Conjunctiva/sclera: Conjunctivae normal.      Pupils: Pupils are equal, round, and reactive to light.   Cardiovascular:      Rate and Rhythm: Normal rate and regular rhythm.   Pulmonary:      Effort: Pulmonary effort is normal.      Breath sounds: Normal breath sounds.   Abdominal:      Palpations: Abdomen is soft.      Tenderness: There is no abdominal tenderness.   Musculoskeletal:         General: No deformity or signs of injury.    Skin:     General: Skin is warm and dry.   Neurological:      General: No focal deficit present.      Mental Status: She is alert. Mental status is at baseline.   Psychiatric:         Mood and Affect: Mood normal.         Behavior: Behavior normal.           Assessment & Plan:   1. Hypothyroidism  Assessment & Plan:  Recheck TSH, T3, T4. Adjust meds based on results  For now, continue NP Thyroid 150 mg (half of a 300 mg tab)    Orders:  -     TSH; Future; Expected date: 03/19/2024  -     T3; Future; Expected date: 03/19/2024  -     T4, Free; Future; Expected date: 03/19/2024    2. Vitamin D deficiency  Comments:  last level low, repeat testing overdue  Orders:  -     Vitamin D; Future; Expected date: 03/19/2024    3. Neutropenia, unspecified type  Comments:  repeat cbc  Orders:  -     CBC Auto Differential; Future; Expected date: 03/19/2024    4. Wellness examination  Comments:  ordering labs in prep for wellness at next visit  Orders:  -     CBC Auto Differential; Future; Expected date: 08/19/2024  -     Comprehensive Metabolic Panel; Future; Expected date: 08/19/2024  -     Hemoglobin A1C; Future; Expected date: 08/19/2024  -     Lipid Panel; Future; Expected date: 08/19/2024  -     TSH; Future; Expected date: 08/19/2024  -     Urinalysis; Future; Expected date: 08/19/2024    5. Obesity, unspecified classification, unspecified obesity type, unspecified whether serious comorbidity present  -     Hemoglobin A1C; Future; Expected date: 08/19/2024          Follow up in about 5 months (around 8/19/2024) for Wellness. In addition to their scheduled follow up, the patient has also been instructed to follow up on as needed basis.

## 2024-03-21 LAB — T3 SERPL IA-MCNC: 157 NG/DL (ref 80–200)

## 2024-03-23 ENCOUNTER — PATIENT MESSAGE (OUTPATIENT)
Dept: PRIMARY CARE CLINIC | Facility: CLINIC | Age: 54
End: 2024-03-23
Payer: COMMERCIAL

## 2024-04-15 ENCOUNTER — PATIENT MESSAGE (OUTPATIENT)
Dept: PRIMARY CARE CLINIC | Facility: CLINIC | Age: 54
End: 2024-04-15
Payer: COMMERCIAL

## 2024-04-19 ENCOUNTER — TELEPHONE (OUTPATIENT)
Dept: INTERNAL MEDICINE | Facility: CLINIC | Age: 54
End: 2024-04-19
Payer: COMMERCIAL

## 2024-04-19 NOTE — TELEPHONE ENCOUNTER
----- Message from Lizeth Jose sent at 4/19/2024 11:13 AM CDT -----  Regarding: return call  Type:  Patient Returning Call    Who Called:pt  Who Left Message for Patient:  Does the patient know what this is regarding?:new pt appt  Would the patient rather a call back or a response via [facility]?   Best Call Back Number:[phone]  Additional Information:

## 2024-04-19 NOTE — ASSESSMENT & PLAN NOTE
Recheck TSH, T3, T4. Adjust meds based on results  For now, continue NP Thyroid 150 mg (half of a 300 mg tab)

## 2024-04-19 NOTE — TELEPHONE ENCOUNTER
Patient called wanting to switch providers due to them not being consistent with their work,   She stated she had blood work done and it was abnormal, and she still has yet to receive a phone call about that .

## 2024-06-10 DIAGNOSIS — R60.9 EDEMA, UNSPECIFIED TYPE: ICD-10-CM

## 2024-06-10 RX ORDER — FUROSEMIDE 20 MG/1
20 TABLET ORAL DAILY PRN
Qty: 30 TABLET | Refills: 0 | Status: SHIPPED | OUTPATIENT
Start: 2024-06-10

## 2024-07-13 DIAGNOSIS — R60.9 EDEMA, UNSPECIFIED TYPE: ICD-10-CM

## 2024-07-15 RX ORDER — FUROSEMIDE 20 MG/1
TABLET ORAL
Qty: 30 TABLET | Refills: 0 | Status: SHIPPED | OUTPATIENT
Start: 2024-07-15

## 2024-09-12 LAB — PAP RECOMMENDATION EXT: NORMAL

## 2024-10-17 ENCOUNTER — TELEPHONE (OUTPATIENT)
Dept: PRIMARY CARE CLINIC | Facility: CLINIC | Age: 54
End: 2024-10-17
Payer: COMMERCIAL

## 2024-10-17 DIAGNOSIS — E03.8 OTHER SPECIFIED HYPOTHYROIDISM: Primary | ICD-10-CM

## 2024-10-17 RX ORDER — THYROID, PORCINE 300 MG/1
150 TABLET ORAL DAILY
Qty: 45 TABLET | Refills: 3 | Status: SHIPPED | OUTPATIENT
Start: 2024-10-17 | End: 2025-01-15

## 2024-10-17 NOTE — LETTER
AUTHORIZATION FOR RELEASE OF   CONFIDENTIAL INFORMATION    Dear Medical Records,    We are seeing Nga Neves, date of birth 1970, in the clinic at [facility]. Hudson Almanza MD is the patient's PCP. Nga Neves has an outstanding lab/procedure at the time we reviewed her chart. In order to help keep her health information updated, she has authorized us to request the following medical record(s):        (X)  MAMMOGRAM                                      (  )  COLONOSCOPY      (X)  PAP SMEAR                                          (  )  OUTSIDE LAB RESULTS     (  )  DEXA SCAN                                          (  )  EYE EXAM            (  )  FOOT EXAM                                          (  )  ENTIRE RECORD     (  )  OUTSIDE IMMUNIZATIONS                 (  )  _______________         Please fax records to [facility], [place], Jeffrey D, MD, [phone]      Patient Name: Nga Neves  : 1970  Patient Phone #: [phone]

## 2024-10-21 ENCOUNTER — DOCUMENTATION ONLY (OUTPATIENT)
Dept: PRIMARY CARE CLINIC | Facility: CLINIC | Age: 54
End: 2024-10-21
Payer: COMMERCIAL

## 2024-10-29 ENCOUNTER — TELEPHONE (OUTPATIENT)
Dept: INTERNAL MEDICINE | Facility: CLINIC | Age: 54
End: 2024-10-29
Payer: COMMERCIAL

## 2024-11-04 ENCOUNTER — TELEPHONE (OUTPATIENT)
Dept: INTERNAL MEDICINE | Facility: CLINIC | Age: 54
End: 2024-11-04
Payer: COMMERCIAL

## 2024-11-04 NOTE — TELEPHONE ENCOUNTER
----- Message from Med Assistant Beltrán sent at 11/4/2024  1:28 PM CST -----  Regarding: FW: return call    ----- Message -----  From: Obed Cisneros  Sent: 11/4/2024   1:18 PM CST  To: Cec[facility] Wade Staff  Subject: return call                                      Who Called: Nga Neves    Patient is returning phone call    Who Left Message for Patient:   Does the patient know what this is regarding?: R/s Appointment       Preferred Method of Contact: Phone Call  Patient's Preferred Phone Number on File: [phone]   Best Call Back Number, if different: [phone] ext [phone]    Additional Information: pt stated she had a missed call about rescheduling her appt with Dr. Salomon. Pt requested to call her back at [phone]. Please advise

## 2024-11-07 ENCOUNTER — OFFICE VISIT (OUTPATIENT)
Dept: INTERNAL MEDICINE | Facility: CLINIC | Age: 54
End: 2024-11-07
Payer: COMMERCIAL

## 2024-11-07 VITALS
WEIGHT: 214 LBS | OXYGEN SATURATION: 98 % | TEMPERATURE: 97 F | BODY MASS INDEX: 35.65 KG/M2 | DIASTOLIC BLOOD PRESSURE: 84 MMHG | HEIGHT: 65 IN | RESPIRATION RATE: 16 BRPM | HEART RATE: 72 BPM | SYSTOLIC BLOOD PRESSURE: 132 MMHG

## 2024-11-07 DIAGNOSIS — K92.89 GAS BLOAT SYNDROME: ICD-10-CM

## 2024-11-07 DIAGNOSIS — E66.812 CLASS 2 OBESITY WITH BODY MASS INDEX (BMI) OF 35.0 TO 35.9 IN ADULT, UNSPECIFIED OBESITY TYPE, UNSPECIFIED WHETHER SERIOUS COMORBIDITY PRESENT: Chronic | ICD-10-CM

## 2024-11-07 DIAGNOSIS — E03.8 OTHER SPECIFIED HYPOTHYROIDISM: Chronic | ICD-10-CM

## 2024-11-07 DIAGNOSIS — L65.9 ALOPECIA: ICD-10-CM

## 2024-11-07 DIAGNOSIS — Z00.00 WELL ADULT EXAM: ICD-10-CM

## 2024-11-07 DIAGNOSIS — Z12.31 SCREENING MAMMOGRAM FOR BREAST CANCER: ICD-10-CM

## 2024-11-07 DIAGNOSIS — R10.10 PAIN OF UPPER ABDOMEN: Primary | ICD-10-CM

## 2024-11-07 DIAGNOSIS — Z83.49 FAMILY HISTORY OF HEMOCHROMATOSIS: Chronic | ICD-10-CM

## 2024-11-07 PROBLEM — R23.2 HOT FLASHES: Status: RESOLVED | Noted: 2022-06-30 | Resolved: 2024-11-07

## 2024-11-07 PROBLEM — A49.02 METHICILLIN RESISTANT STAPHYLOCOCCUS AUREUS INFECTION: Status: RESOLVED | Noted: 2022-06-30 | Resolved: 2024-11-07

## 2024-11-07 NOTE — PROGRESS NOTES
Internal Medicine    Patient ID: [id number]     Chief Complaint: Establish Care      HPI:     Nga Neves is a 54 y.o. female here today for a follow up.   New patient  [facility]  Ongoing issues with bloating, gas, abdominal pain- comes and goes  Hair thinning  Lost 100 lbs in the past 5 years  Works out daily  Weight got down to 165 in 2022 but has struggled to lose weight and keeps gaining weight irrespecitve of her dedicated exercise and diet program  Moderate physical activity for 45 minutes- 4 days a week; including resistance training  Biotin 3 weeks ago  Super B 3 weeks ago    Past Medical History:   Diagnosis Date    Anxiety 6/30/2022    Family history of hemochromatosis 6/30/2022    Hot flashes 6/30/2022    Hypothyroidism 6/30/2022    Hypothyroidism, unspecified     Methicillin resistant Staphylococcus aureus infection 6/30/2022 2007    Seasonal allergies 8/30/2022    Vertigo 6/30/2022        Past Surgical History:   Procedure Laterality Date    ABDOMINOPLASTY N/A 07/07/2021    Abdominoplasty and liposuction N/A     APPENDECTOMY      BREAST SURGERY  2022    Breast reduction    BUNIONECTOMY N/A     COSMETIC SURGERY  2022    Skin removal, tummy tuck    Liposuction (Flank, Bilateral) Bilateral 07/07/2021    Mammogram  11/24/2020    MASTOPEXY  07/07/2021    REDUCTION OF BOTH BREASTS Bilateral 07/07/2021    Skin care      surgery on back due to mrsa 2007      TONSILLECTOMY      1975    TONSILLECTOMY AND ADENOIDECTOMY          Social History     Tobacco Use    Smoking status: Never    Smokeless tobacco: Never   Substance and Sexual Activity    Alcohol use: Yes     Comment: 1-2 times per month    Drug use: Never    Sexual activity: Not Currently     Partners: Male     Birth control/protection: Abstinence        Current Outpatient Medications   Medication Instructions    ARMOUR THYROID 150 mg, Oral, Daily, Take 1/2 tablet = 150 mg daily.    furosemide (LASIX) 20 MG tablet TAKE 1 TABLET(20  "MG) BY MOUTH DAILY AS NEEDED FOR LEG SWELLING    loratadine (CLARITIN) 10 mg, Daily       Review of patient's allergies indicates:  No Known Allergies     Patient Care Team:  Mauro Sepulveda MD as PCP - General (Internal Medicine)  Per Means MD as Consulting Physician (Obstetrics and Gynecology)  [facility] -     Subjective:     Review of Systems    12 point review of systems conducted, negative except as stated in the history of present illness. See HPI for details.    Objective:     Visit Vitals  /84 (BP Location: Left arm, Patient Position: Sitting)   Pulse 72   Temp 97.2 °F (36.2 °C) (Temporal)   Resp 16   Ht 5' 5" (1.651 m)   Wt 97.1 kg (214 lb)   SpO2 98%   BMI 35.61 kg/m²       Physical Exam  Constitutional:       Appearance: Normal appearance.   HENT:      Head: Normocephalic and atraumatic.      Mouth/Throat:      Comments: Sub centimeter thyroid nodule  Eyes:      Extraocular Movements: Extraocular movements intact.      Pupils: Pupils are equal, round, and reactive to light.   Cardiovascular:      Rate and Rhythm: Normal rate and regular rhythm.   Pulmonary:      Effort: Pulmonary effort is normal.      Breath sounds: Normal breath sounds.   Abdominal:      Comments: Central Adiposity.   Skin:     General: Skin is warm and dry.   Neurological:      General: No focal deficit present.      Mental Status: She is alert.   Psychiatric:         Mood and Affect: Mood normal.         Labs Reviewed:     Chemistry:  Lab Results   Component Value Date     08/15/2023    K 4.3 08/15/2023    BUN 20.2 (H) 08/15/2023    CREATININE 0.77 08/15/2023    EGFRNORACEVR >60 08/15/2023    GLUCOSE 89 08/15/2023    CALCIUM 9.5 08/15/2023    ALKPHOS 69 08/15/2023    LABPROT 6.5 08/15/2023    ALBUMIN 4.1 08/15/2023    BILIDIR 0.2 12/30/2021    IBILI 0.20 12/30/2021    AST 25 08/15/2023    ALT 40 08/15/2023    [id number] 22.9 (L) 03/19/2024    TSH 1.746 03/19/2024    " HMHSKL3SPQO 0.85 03/19/2024        Lab Results   Component Value Date    HGBA1C 4.7 08/15/2023        Hematology:  Lab Results   Component Value Date    WBC 4.49 (L) 03/19/2024    HGB 14.3 03/19/2024    HCT 41.2 03/19/2024     03/19/2024       Lipid Panel:  Lab Results   Component Value Date    CHOL 174 08/15/2023    HDL 79 (H) 08/15/2023    LDL 89.00 08/15/2023    TRIG 29 (L) 08/15/2023    TOTALCHOLEST 2 08/15/2023        Urine:  Lab Results   Component Value Date    APPEARANCEUA Clear 08/15/2023    SGUA 1.027 08/15/2023    PROTEINUA Negative 08/15/2023    KETONESUA Negative 08/15/2023    LEUKOCYTESUR Negative 08/15/2023    RBCUA <5 08/15/2023    WBCUA <5 08/15/2023    BACTERIA None Seen 08/15/2023        Assessment:       ICD-10-CM ICD-9-CM   1. Pain of upper abdomen  R10.10 789.09   2. Screening mammogram for breast cancer  Z12.31 V76.12   3. Well adult exam  Z00.00 V70.0   4. Family history of hemochromatosis  Z83.49 V18.19   5. Hypothyroidism  E03.8 244.8   6. Alopecia  L65.9 704.00   7. Class 2 obesity with body mass index (BMI) of 35.0 to 35.9 in adult, unspecified obesity type, unspecified whether serious comorbidity present  E66.812 278.00    Z68.35 V85.35   8. Gas bloat syndrome  K92.89 569.89        Plan:     1. Pain of upper abdomen  -     Basic Metabolic Panel; Future; Expected date: 11/07/2024  -     CBC Auto Differential; Future; Expected date: 11/07/2024    2. Screening mammogram for breast cancer  -     Mammo Digital Screening Bilat; Future; Expected date: 11/07/2024    3. Well adult exam  -     Ambulatory referral/consult to Gastroenterology; Future; Expected date: 11/14/2024    4. Family history of hemochromatosis  -     Ferritin; Future; Expected date: 11/07/2024    5. Hypothyroidism  -     THYROID PEROXIDASE (TPO); Future; Expected date: 11/07/2024  -     Thyroglobulin; Future; Expected date: 11/07/2024  -     TSH; Future; Expected date: 11/07/2024  -     T4, Free; Future; Expected date:  11/07/2024  -     T3; Future; Expected date: 11/07/2024  -     US Soft Tissue Head Neck; Future; Expected date: 11/07/2024    6. Alopecia  -     Cortisol; Future; Expected date: 11/07/2024  -     Zinc; Future; Expected date: 11/07/2024  -     Testosterone; Future; Expected date: 11/07/2024  -     Cortisol; Future; Expected date: 11/07/2024  -     Basic Metabolic Panel; Future; Expected date: 11/07/2024  -     CBC Auto Differential; Future; Expected date: 11/07/2024  -     DHEA-Sulfate; Future; Expected date: 11/07/2024    7. Class 2 obesity with body mass index (BMI) of 35.0 to 35.9 in adult, unspecified obesity type, unspecified whether serious comorbidity present  -     Cortisol; Future; Expected date: 11/07/2024  -     DHEA-Sulfate; Future; Expected date: 11/07/2024    8. Gas bloat syndrome  -     TISSUE TRANSGLUTAMINASE IGA ANTIBODY; Future; Expected date: 11/07/2024         Follow up in about 6 weeks (around 12/19/2024). In addition to their scheduled follow up, the patient has also been instructed to follow up on as needed basis.     No future appointments.     Mauro Sepulveda MD  An office visit for a new patient was performed. 10 minutes was used for reviewing the patients chart prior to the inoice visit done on that same day. 20 minutes was used during the visit in regards to taking the patient history and physical exam. There was also an additional 10 minutes spent on education and counseling regarding medical conditions, current medications including risk/benefit and side effects/adverse events, vaccine counseling. After leaving the exam room, the provider then spent an additional 15 minutes completing the electronic health record.    The patient is receptive, expresses understanding and is agreeable to plan. All questions answered; total time spent was 60 minutes.

## 2024-11-09 ENCOUNTER — LAB VISIT (OUTPATIENT)
Dept: LAB | Facility: HOSPITAL | Age: 54
End: 2024-11-09
Attending: INTERNAL MEDICINE
Payer: COMMERCIAL

## 2024-11-09 DIAGNOSIS — E03.8 OTHER SPECIFIED HYPOTHYROIDISM: Chronic | ICD-10-CM

## 2024-11-09 DIAGNOSIS — L65.9 ALOPECIA: ICD-10-CM

## 2024-11-09 DIAGNOSIS — K92.89 GAS BLOAT SYNDROME: ICD-10-CM

## 2024-11-09 LAB — TESTOST SERPL-MCNC: 47.39 NG/DL (ref 12.4–35.75)

## 2024-11-09 PROCEDURE — 84432 ASSAY OF THYROGLOBULIN: CPT

## 2024-11-09 PROCEDURE — 86376 MICROSOMAL ANTIBODY EACH: CPT

## 2024-11-09 PROCEDURE — 86364 TISS TRNSGLTMNASE EA IG CLAS: CPT

## 2024-11-09 PROCEDURE — 36415 COLL VENOUS BLD VENIPUNCTURE: CPT

## 2024-11-09 PROCEDURE — 84403 ASSAY OF TOTAL TESTOSTERONE: CPT

## 2024-11-12 LAB
ENDOCRINOLOGIST REVIEW: ABNORMAL
THYROGLOB AB SERPL IA-ACNC: <1.8 IU/ML
THYROGLOB SERPL-MCNC: 17 NG/ML

## 2024-11-13 ENCOUNTER — PATIENT MESSAGE (OUTPATIENT)
Dept: INTERNAL MEDICINE | Facility: CLINIC | Age: 54
End: 2024-11-13
Payer: COMMERCIAL

## 2024-11-14 LAB
ELIA CELIKEY IGA (TTG IGA) QUANTITATIVE: <0.2 U/ML
THYROID PEROXIDASE QUANT (OLG): 270 IU/ML

## 2024-11-14 NOTE — PROGRESS NOTES
So TPO antibody is strongly positive at 270.  Patient is on [place] thyroid 300 mg a day  And I only see where they sanjuanita the thyroid antibodies, testosterone labs I ordered a bunch of other labs you can see there still in the chart they were not performed I need those done

## 2024-11-16 ENCOUNTER — LAB VISIT (OUTPATIENT)
Dept: LAB | Facility: HOSPITAL | Age: 54
End: 2024-11-16
Attending: INTERNAL MEDICINE
Payer: COMMERCIAL

## 2024-11-16 DIAGNOSIS — E66.9 OBESITY: ICD-10-CM

## 2024-11-16 DIAGNOSIS — Z00.00 WELLNESS EXAMINATION: ICD-10-CM

## 2024-11-16 LAB
ALBUMIN SERPL-MCNC: 4.1 G/DL (ref 3.5–5)
ALBUMIN/GLOB SERPL: 1.5 RATIO (ref 1.1–2)
ALP SERPL-CCNC: 88 UNIT/L (ref 40–150)
ALT SERPL-CCNC: 32 UNIT/L (ref 0–55)
ANION GAP SERPL CALC-SCNC: 6 MEQ/L
AST SERPL-CCNC: 21 UNIT/L (ref 5–34)
BACTERIA #/AREA URNS AUTO: NORMAL /HPF
BASOPHILS # BLD AUTO: 0.01 X10(3)/MCL
BASOPHILS NFR BLD AUTO: 0.4 %
BILIRUB SERPL-MCNC: 0.6 MG/DL
BILIRUB UR QL STRIP.AUTO: NEGATIVE
BUN SERPL-MCNC: 18.4 MG/DL (ref 9.8–20.1)
CALCIUM SERPL-MCNC: 9.8 MG/DL (ref 8.4–10.2)
CHLORIDE SERPL-SCNC: 107 MMOL/L (ref 98–107)
CHOLEST SERPL-MCNC: 168 MG/DL
CHOLEST/HDLC SERPL: 2 {RATIO} (ref 0–5)
CLARITY UR: CLEAR
CO2 SERPL-SCNC: 29 MMOL/L (ref 22–29)
COLOR UR AUTO: NORMAL
CREAT SERPL-MCNC: 0.82 MG/DL (ref 0.55–1.02)
CREAT/UREA NIT SERPL: 22
EOSINOPHIL # BLD AUTO: 0.05 X10(3)/MCL (ref 0–0.9)
EOSINOPHIL NFR BLD AUTO: 1.8 %
ERYTHROCYTE [DISTWIDTH] IN BLOOD BY AUTOMATED COUNT: 11.6 % (ref 11.5–17)
EST. AVERAGE GLUCOSE BLD GHB EST-MCNC: 91.1 MG/DL
GFR SERPLBLD CREATININE-BSD FMLA CKD-EPI: >60 ML/MIN/1.73/M2
GLOBULIN SER-MCNC: 2.8 GM/DL (ref 2.4–3.5)
GLUCOSE SERPL-MCNC: 97 MG/DL (ref 74–100)
GLUCOSE UR QL STRIP: NEGATIVE
HBA1C MFR BLD: 4.8 %
HCT VFR BLD AUTO: 42.9 % (ref 37–47)
HDLC SERPL-MCNC: 79 MG/DL (ref 35–60)
HGB BLD-MCNC: 14.5 G/DL (ref 12–16)
HGB UR QL STRIP: NEGATIVE
IMM GRANULOCYTES # BLD AUTO: 0 X10(3)/MCL (ref 0–0.04)
IMM GRANULOCYTES NFR BLD AUTO: 0 %
KETONES UR QL STRIP: NEGATIVE
LDLC SERPL CALC-MCNC: 82 MG/DL (ref 50–140)
LEUKOCYTE ESTERASE UR QL STRIP: NEGATIVE
LYMPHOCYTES # BLD AUTO: 1.1 X10(3)/MCL (ref 0.6–4.6)
LYMPHOCYTES NFR BLD AUTO: 39.3 %
MCH RBC QN AUTO: 31.5 PG (ref 27–31)
MCHC RBC AUTO-ENTMCNC: 33.8 G/DL (ref 33–36)
MCV RBC AUTO: 93.3 FL (ref 80–94)
MONOCYTES # BLD AUTO: 0.34 X10(3)/MCL (ref 0.1–1.3)
MONOCYTES NFR BLD AUTO: 12.1 %
NEUTROPHILS # BLD AUTO: 1.3 X10(3)/MCL (ref 2.1–9.2)
NEUTROPHILS NFR BLD AUTO: 46.4 %
NITRITE UR QL STRIP: NEGATIVE
PH UR STRIP: 6 [PH]
PLATELET # BLD AUTO: 271 X10(3)/MCL (ref 130–400)
PMV BLD AUTO: 8.6 FL (ref 7.4–10.4)
POTASSIUM SERPL-SCNC: 4.7 MMOL/L (ref 3.5–5.1)
PROT SERPL-MCNC: 6.9 GM/DL (ref 6.4–8.3)
PROT UR QL STRIP: NEGATIVE
RBC # BLD AUTO: 4.6 X10(6)/MCL (ref 4.2–5.4)
RBC #/AREA URNS AUTO: NORMAL /HPF
SODIUM SERPL-SCNC: 142 MMOL/L (ref 136–145)
SP GR UR STRIP.AUTO: 1.01 (ref 1–1.03)
SQUAMOUS #/AREA URNS AUTO: NORMAL /HPF
TRIGL SERPL-MCNC: 33 MG/DL (ref 37–140)
TSH SERPL-ACNC: 11.83 UIU/ML (ref 0.35–4.94)
UROBILINOGEN UR STRIP-ACNC: 0.2
VLDLC SERPL CALC-MCNC: 7 MG/DL
WBC # BLD AUTO: 2.8 X10(3)/MCL (ref 4.5–11.5)
WBC #/AREA URNS AUTO: NORMAL /HPF

## 2024-11-16 PROCEDURE — 81003 URINALYSIS AUTO W/O SCOPE: CPT

## 2024-11-16 PROCEDURE — 83036 HEMOGLOBIN GLYCOSYLATED A1C: CPT

## 2024-11-16 PROCEDURE — 36415 COLL VENOUS BLD VENIPUNCTURE: CPT

## 2024-11-16 PROCEDURE — 85025 COMPLETE CBC W/AUTO DIFF WBC: CPT

## 2024-11-16 PROCEDURE — 80053 COMPREHEN METABOLIC PANEL: CPT

## 2024-11-16 PROCEDURE — 80061 LIPID PANEL: CPT

## 2024-11-16 PROCEDURE — 84443 ASSAY THYROID STIM HORMONE: CPT

## 2024-11-18 ENCOUNTER — PATIENT MESSAGE (OUTPATIENT)
Dept: INTERNAL MEDICINE | Facility: CLINIC | Age: 54
End: 2024-11-18
Payer: COMMERCIAL

## 2024-11-18 ENCOUNTER — HOSPITAL ENCOUNTER (OUTPATIENT)
Dept: RADIOLOGY | Facility: HOSPITAL | Age: 54
Discharge: HOME | End: 2024-11-18
Attending: INTERNAL MEDICINE
Payer: COMMERCIAL

## 2024-11-18 DIAGNOSIS — E03.8 OTHER SPECIFIED HYPOTHYROIDISM: Chronic | ICD-10-CM

## 2024-11-18 PROCEDURE — 76536 US EXAM OF HEAD AND NECK: CPT | Mod: TC

## 2024-11-18 RX ORDER — LEVOTHYROXINE SODIUM 200 UG/1
200 TABLET ORAL
Qty: 30 TABLET | Refills: 11 | Status: SHIPPED | OUTPATIENT
Start: 2024-11-18 | End: 2025-11-18

## 2024-11-18 NOTE — PROGRESS NOTES
Rachelle your TSH is at 11.827  Can you confirm what dose of thyroid medication you were taking please  Cause I have in my notes that it is Roslyn thyroid 150 mg we are going to need to increase that dose and are you taking it 1st thing in the morning on an empty stomach 30 minutes away from all other medication food and drink?

## 2024-11-19 ENCOUNTER — PATIENT MESSAGE (OUTPATIENT)
Dept: INTERNAL MEDICINE | Facility: CLINIC | Age: 54
End: 2024-11-19
Payer: COMMERCIAL

## 2024-11-19 ENCOUNTER — TELEPHONE (OUTPATIENT)
Dept: INTERNAL MEDICINE | Facility: CLINIC | Age: 54
End: 2024-11-19
Payer: COMMERCIAL

## 2024-11-19 NOTE — PROGRESS NOTES
US thyroid reviewed  US findings consistent with her Hashimotos  No discrete nodules  No futher follow up

## 2024-11-19 NOTE — TELEPHONE ENCOUNTER
----- Message from Mauro Sepulveda MD sent at 11/19/2024  7:23 AM CST -----  US thyroid reviewed  US findings consistent with her Hashimotos  No discrete nodules  No futher follow up

## 2024-11-20 NOTE — TELEPHONE ENCOUNTER
Pt informed of results     Hawk Gonzales Staff  Caller: Unspecified (Today,  9:35 AM)  .Who Called: Nga Neves    Patient is returning phone call    Who Left Message for Patient:Whitley Le CMA  Does the patient know what this is regarding?:results      Preferred Method of Contact: Phone Call  Patient's Preferred Phone Number on File: [phone]  Best Call Back Number, if different:  Additional Information:

## 2024-11-21 ENCOUNTER — HOSPITAL ENCOUNTER (OUTPATIENT)
Dept: RADIOLOGY | Facility: HOSPITAL | Age: 54
Discharge: HOME | End: 2024-11-21
Attending: INTERNAL MEDICINE
Payer: COMMERCIAL

## 2024-11-21 DIAGNOSIS — Z12.31 SCREENING MAMMOGRAM FOR BREAST CANCER: ICD-10-CM

## 2024-11-21 PROCEDURE — 77063 BREAST TOMOSYNTHESIS BI: CPT | Mod: 26,,, | Performed by: RADIOLOGY

## 2024-11-21 PROCEDURE — 77063 BREAST TOMOSYNTHESIS BI: CPT | Mod: TC

## 2024-11-21 PROCEDURE — 77067 SCR MAMMO BI INCL CAD: CPT | Mod: 26,,, | Performed by: RADIOLOGY

## 2024-11-22 ENCOUNTER — TELEPHONE (OUTPATIENT)
Dept: INTERNAL MEDICINE | Facility: CLINIC | Age: 54
End: 2024-11-22
Payer: COMMERCIAL

## 2024-11-22 NOTE — TELEPHONE ENCOUNTER
----- Message from Domingo Andrade sent at 11/21/2024  3:56 PM CST -----  Regarding: FW: referral update    ----- Message -----  From: Naomi Lizarraga  Sent: 11/21/2024   3:43 PM CST  To: Cecilio Wade Staff  Subject: referral update                                  This is Naomi with [facility](referral scheduling)    I contacted [facility] to follow up on the  referral.  Vicki  stated they did not receive referral. ( I saw it was routed on 11/13) She suggested the referral is resubmitted and faxed to [phone] or [phone]. Thanks

## 2024-11-25 ENCOUNTER — TELEPHONE (OUTPATIENT)
Dept: INTERNAL MEDICINE | Facility: CLINIC | Age: 54
End: 2024-11-25
Payer: COMMERCIAL

## 2024-11-25 NOTE — TELEPHONE ENCOUNTER
----- Message from Mauro Sepulveda MD sent at 11/25/2024  8:46 AM CST -----  Normal MMG. Repeat in 1 year.

## 2024-12-12 ENCOUNTER — TELEPHONE (OUTPATIENT)
Dept: INTERNAL MEDICINE | Facility: CLINIC | Age: 54
End: 2024-12-12
Payer: COMMERCIAL

## 2024-12-12 DIAGNOSIS — L65.9 ALOPECIA: ICD-10-CM

## 2024-12-12 DIAGNOSIS — Z83.49 FAMILY HISTORY OF HEMOCHROMATOSIS: ICD-10-CM

## 2024-12-12 DIAGNOSIS — E03.8 OTHER SPECIFIED HYPOTHYROIDISM: Primary | ICD-10-CM

## 2024-12-12 NOTE — TELEPHONE ENCOUNTER
----- Message from Med Assistant Andrade sent at 12/12/2024  9:54 AM CST -----  Regarding: AMY 12/26/24 @ 1:40 Dr. Salomon  1. Are there any outstanding tasks in the patient's chart? Yes, fasting labs    2. Is there any documentation in the chart? No    3.Has patient been seen in an ER, Urgent care clinic, or been admitted since last visit?  If yes, When, where, and why    4. Has patient seen any other healthcare providers since last visit?  If yes, when, where, and why    5. Has patient had any bloodwork or XR done since last visit?    6. Is patient signed up for patient portal?    7. Does patient have home blood pressure cuff?   If yes, please have patient bring to appointment for validation.

## 2024-12-26 ENCOUNTER — OFFICE VISIT (OUTPATIENT)
Dept: INTERNAL MEDICINE | Facility: CLINIC | Age: 54
End: 2024-12-26
Payer: COMMERCIAL

## 2024-12-26 VITALS
SYSTOLIC BLOOD PRESSURE: 132 MMHG | TEMPERATURE: 98 F | DIASTOLIC BLOOD PRESSURE: 80 MMHG | OXYGEN SATURATION: 99 % | WEIGHT: 216 LBS | RESPIRATION RATE: 16 BRPM | HEART RATE: 64 BPM | BODY MASS INDEX: 35.99 KG/M2 | HEIGHT: 65 IN

## 2024-12-26 DIAGNOSIS — L65.9 ALOPECIA: ICD-10-CM

## 2024-12-26 DIAGNOSIS — E66.812 CLASS 2 OBESITY WITH BODY MASS INDEX (BMI) OF 35.0 TO 35.9 IN ADULT, UNSPECIFIED OBESITY TYPE, UNSPECIFIED WHETHER SERIOUS COMORBIDITY PRESENT: Primary | Chronic | ICD-10-CM

## 2024-12-26 DIAGNOSIS — Z83.49 FAMILY HISTORY OF HEMOCHROMATOSIS: ICD-10-CM

## 2024-12-26 DIAGNOSIS — E03.8 OTHER SPECIFIED HYPOTHYROIDISM: ICD-10-CM

## 2024-12-26 LAB
ALBUMIN SERPL-MCNC: 4.2 G/DL (ref 3.5–5)
ALBUMIN/GLOB SERPL: 1.6 RATIO (ref 1.1–2)
ALP SERPL-CCNC: 89 UNIT/L (ref 40–150)
ALT SERPL-CCNC: 40 UNIT/L (ref 0–55)
ANION GAP SERPL CALC-SCNC: 9 MEQ/L
AST SERPL-CCNC: 25 UNIT/L (ref 5–34)
BASOPHILS # BLD AUTO: 0.02 X10(3)/MCL
BASOPHILS NFR BLD AUTO: 0.5 %
BILIRUB SERPL-MCNC: 0.7 MG/DL
BUN SERPL-MCNC: 12 MG/DL (ref 9.8–20.1)
CALCIUM SERPL-MCNC: 9.4 MG/DL (ref 8.4–10.2)
CHLORIDE SERPL-SCNC: 103 MMOL/L (ref 98–107)
CO2 SERPL-SCNC: 27 MMOL/L (ref 22–29)
CREAT SERPL-MCNC: 0.63 MG/DL (ref 0.55–1.02)
CREAT/UREA NIT SERPL: 19
EOSINOPHIL # BLD AUTO: 0.08 X10(3)/MCL (ref 0–0.9)
EOSINOPHIL NFR BLD AUTO: 2.1 %
ERYTHROCYTE [DISTWIDTH] IN BLOOD BY AUTOMATED COUNT: 11.9 % (ref 11.5–17)
FERRITIN SERPL-MCNC: 191.09 NG/ML (ref 4.63–204)
GFR SERPLBLD CREATININE-BSD FMLA CKD-EPI: >60 ML/MIN/1.73/M2
GLOBULIN SER-MCNC: 2.6 GM/DL (ref 2.4–3.5)
GLUCOSE SERPL-MCNC: 92 MG/DL (ref 74–100)
HCT VFR BLD AUTO: 41 % (ref 37–47)
HGB BLD-MCNC: 14.1 G/DL (ref 12–16)
IMM GRANULOCYTES # BLD AUTO: 0.01 X10(3)/MCL (ref 0–0.04)
IMM GRANULOCYTES NFR BLD AUTO: 0.3 %
LYMPHOCYTES # BLD AUTO: 1.32 X10(3)/MCL (ref 0.6–4.6)
LYMPHOCYTES NFR BLD AUTO: 35.2 %
MCH RBC QN AUTO: 31.5 PG (ref 27–31)
MCHC RBC AUTO-ENTMCNC: 34.4 G/DL (ref 33–36)
MCV RBC AUTO: 91.7 FL (ref 80–94)
MONOCYTES # BLD AUTO: 0.46 X10(3)/MCL (ref 0.1–1.3)
MONOCYTES NFR BLD AUTO: 12.3 %
NEUTROPHILS # BLD AUTO: 1.86 X10(3)/MCL (ref 2.1–9.2)
NEUTROPHILS NFR BLD AUTO: 49.6 %
NRBC BLD AUTO-RTO: 0 %
PLATELET # BLD AUTO: 290 X10(3)/MCL (ref 130–400)
PMV BLD AUTO: 8.8 FL (ref 7.4–10.4)
POTASSIUM SERPL-SCNC: 3.8 MMOL/L (ref 3.5–5.1)
PROT SERPL-MCNC: 6.8 GM/DL (ref 6.4–8.3)
RBC # BLD AUTO: 4.47 X10(6)/MCL (ref 4.2–5.4)
SODIUM SERPL-SCNC: 139 MMOL/L (ref 136–145)
T3FREE SERPL-MCNC: 3.5 PG/ML (ref 1.58–3.91)
T4 FREE SERPL-MCNC: 1.88 NG/DL (ref 0.7–1.48)
TSH SERPL-ACNC: 0.03 UIU/ML (ref 0.35–4.94)
WBC # BLD AUTO: 3.75 X10(3)/MCL (ref 4.5–11.5)

## 2024-12-26 PROCEDURE — 84439 ASSAY OF FREE THYROXINE: CPT | Performed by: INTERNAL MEDICINE

## 2024-12-26 PROCEDURE — 80053 COMPREHEN METABOLIC PANEL: CPT | Performed by: INTERNAL MEDICINE

## 2024-12-26 PROCEDURE — 82627 DEHYDROEPIANDROSTERONE: CPT | Performed by: INTERNAL MEDICINE

## 2024-12-26 PROCEDURE — 84443 ASSAY THYROID STIM HORMONE: CPT | Performed by: INTERNAL MEDICINE

## 2024-12-26 PROCEDURE — 82728 ASSAY OF FERRITIN: CPT | Performed by: INTERNAL MEDICINE

## 2024-12-26 PROCEDURE — 84630 ASSAY OF ZINC: CPT | Performed by: INTERNAL MEDICINE

## 2024-12-26 PROCEDURE — 84481 FREE ASSAY (FT-3): CPT | Performed by: INTERNAL MEDICINE

## 2024-12-26 PROCEDURE — 85025 COMPLETE CBC W/AUTO DIFF WBC: CPT | Performed by: INTERNAL MEDICINE

## 2024-12-26 PROCEDURE — 36415 COLL VENOUS BLD VENIPUNCTURE: CPT | Performed by: INTERNAL MEDICINE

## 2024-12-26 NOTE — PROGRESS NOTES
Internal Medicine    Patient ID: [id number]     Chief Complaint: Hair Loss and Vaginal Atrophy      HPI:     Nga Neves is a 54 y.o. female here today for an annual wellness visit. Reviewed and discussed lab results.   [facility] for GYN  Ongoing issues with bloating, gas, abdominal pain- comes and goes  Hair thinning  Lost 100 lbs in the past 5 years  Works out daily  Weight got down to 165 in 2022 but has struggled to lose weight and keeps gaining weight irrespecitve of her dedicated exercise and diet program  Moderate physical activity for 45 minutes- 4 days a week; including resistance training  Biotin 3 weeks ago  Super B 3 weeks ago    TSH came back at almost 12 we discontinue her [place] thyroid and switch to Synthroid overall she feels better however she still is gaining weight so I am thinking we still need more thyroid medicine she did not do her labs prior to the visit so will check those today.      Health Maintenance         Date Due Completion Date    HIV Screening Never done ---    Shingles Vaccine (1 of 2) Never done ---    Pneumococcal Vaccines (Age 50+) (1 of 1 - PCV) Never done ---    COVID-19 Vaccine (1 - 2024-25 season) Never done ---    Cervical Cancer Screening 09/12/2025 9/12/2024    Mammogram 11/22/2025 11/22/2024    Colorectal Cancer Screening 12/28/2025 12/28/2022    TETANUS VACCINE 04/19/2027 4/19/2017    Hemoglobin A1c (Diabetic Prevention Screening) 11/16/2027 11/16/2024    Lipid Panel 11/16/2029 11/16/2024    RSV Vaccine (Age 60+ and Pregnant patients) (1 - 1-dose 75+ series) 07/30/2044 ---             Past Medical History:   Diagnosis Date    Anxiety 6/30/2022    Family history of hemochromatosis 6/30/2022    Hot flashes 6/30/2022    Hypothyroidism 6/30/2022    Hypothyroidism, unspecified     Methicillin resistant Staphylococcus aureus infection 6/30/2022 2007    Seasonal allergies 8/30/2022    Vertigo 6/30/2022        Past Surgical History:   Procedure Laterality Date  "   ABDOMINOPLASTY N/A 07/07/2021    Abdominoplasty and liposuction N/A     APPENDECTOMY      BREAST SURGERY  2022    Breast reduction    BUNIONECTOMY N/A     COSMETIC SURGERY  2022    Skin removal, tummy tuck    Liposuction (Flank, Bilateral) Bilateral 07/07/2021    Mammogram  11/24/2020    MASTOPEXY  07/07/2021    REDUCTION OF BOTH BREASTS Bilateral 07/07/2021    Skin care      surgery on back due to mrsa 2007      TONSILLECTOMY      1975    TONSILLECTOMY AND ADENOIDECTOMY          Social History     Tobacco Use    Smoking status: Never    Smokeless tobacco: Never   Substance and Sexual Activity    Alcohol use: Yes     Comment: 1-2 times per month    Drug use: Never    Sexual activity: Not Currently     Partners: Male     Birth control/protection: Abstinence        Current Outpatient Medications   Medication Instructions    furosemide (LASIX) 20 MG tablet TAKE 1 TABLET(20 MG) BY MOUTH DAILY AS NEEDED FOR LEG SWELLING    levothyroxine (SYNTHROID) 200 mcg, Oral, Before breakfast    loratadine (CLARITIN) 10 mg, Daily       Review of patient's allergies indicates:  No Known Allergies     Patient Care Team:  Mauro Sepulveda MD as PCP - General (Internal Medicine)  Per Means MD as Consulting Physician (Obstetrics and Gynecology)  [facility] -     Subjective:     Review of Systems    12 point review of systems conducted, negative except as stated in the history of present illness. See HPI for details.    Objective:     Visit Vitals  /80 (BP Location: Left arm, Patient Position: Sitting)   Pulse 64   Temp 97.9 °F (36.6 °C) (Temporal)   Resp 16   Ht 5' 5" (1.651 m)   Wt 98 kg (216 lb)   SpO2 99%   BMI 35.94 kg/m²       Physical Exam    Labs Reviewed:     Chemistry:  Lab Results   Component Value Date     11/16/2024    K 4.7 11/16/2024    BUN 18.4 11/16/2024    CREATININE 0.82 11/16/2024    EGFRNORACEVR >60 11/16/2024    GLUCOSE 97 11/16/2024    CALCIUM 9.8 11/16/2024 "    ALKPHOS 88 11/16/2024    LABPROT 6.9 11/16/2024    ALBUMIN 4.1 11/16/2024    BILIDIR 0.2 12/30/2021    IBILI 0.20 12/30/2021    AST 21 11/16/2024    ALT 32 11/16/2024    [id number] 22.9 (L) 03/19/2024    TSH 11.827 (H) 11/16/2024    [id number] 0.85 03/19/2024        Lab Results   Component Value Date    HGBA1C 4.8 11/16/2024        Hematology:  Lab Results   Component Value Date    WBC 2.80 (L) 11/16/2024    HGB 14.5 11/16/2024    HCT 42.9 11/16/2024     11/16/2024       Lipid Panel:  Lab Results   Component Value Date    CHOL 168 11/16/2024    HDL 79 (H) 11/16/2024    LDL 82.00 11/16/2024    TRIG 33 (L) 11/16/2024    TOTALCHOLEST 2 11/16/2024        Urine:  Lab Results   Component Value Date    APPEARANCEUA Clear 11/16/2024    SGUA 1.015 11/16/2024    PROTEINUA Negative 11/16/2024    KETONESUA Negative 11/16/2024    LEUKOCYTESUR Negative 11/16/2024    RBCUA None Seen 11/16/2024    WBCUA 0-2 11/16/2024    BACTERIA None Seen 11/16/2024        Assessment:       ICD-10-CM ICD-9-CM   1. Class 2 obesity with body mass index (BMI) of 35.0 to 35.9 in adult, unspecified obesity type, unspecified whether serious comorbidity present  E66.812 278.00    Z68.35 V85.35   2. Alopecia  L65.9 704.00   3. Family history of hemochromatosis  Z83.49 V18.19        Plan:     1. Class 2 obesity with body mass index (BMI) of 35.0 to 35.9 in adult, unspecified obesity type, unspecified whether serious comorbidity present  Assessment & Plan:  Check labs today we may need to add a little Cytomel to her Synthroid      2. Alopecia  Assessment & Plan:  Neutrofol menopausal variety 4 capsules daily  Topical finasteride minoxidil      3. Family history of hemochromatosis  Assessment & Plan:  Check ferritin           Follow up in about 3 months (around 3/26/2025) for with labs prior to visit, thyroid ---[place]. In addition to their scheduled follow up, the patient has also been instructed to follow up on as needed basis.     No future  appointments.     Mauro Sepulveda MD

## 2024-12-27 ENCOUNTER — TELEPHONE (OUTPATIENT)
Dept: INTERNAL MEDICINE | Facility: CLINIC | Age: 54
End: 2024-12-27
Payer: COMMERCIAL

## 2024-12-27 DIAGNOSIS — E66.812 CLASS 2 OBESITY WITH BODY MASS INDEX (BMI) OF 35.0 TO 35.9 IN ADULT, UNSPECIFIED OBESITY TYPE, UNSPECIFIED WHETHER SERIOUS COMORBIDITY PRESENT: Primary | ICD-10-CM

## 2024-12-27 DIAGNOSIS — E66.812 CLASS 2 OBESITY WITH BODY MASS INDEX (BMI) OF 35.0 TO 35.9 IN ADULT, UNSPECIFIED OBESITY TYPE, UNSPECIFIED WHETHER SERIOUS COMORBIDITY PRESENT: ICD-10-CM

## 2024-12-27 RX ORDER — TIRZEPATIDE 2.5 MG/.5ML
2.5 INJECTION, SOLUTION SUBCUTANEOUS
Qty: 2 ML | Refills: 0 | Status: SHIPPED | OUTPATIENT
Start: 2024-12-27

## 2024-12-27 NOTE — TELEPHONE ENCOUNTER
Spoke to pt. Pt Verbally confirmed understanding. She stated that she would like to start the Zepbound. RX sent to pharmacy on file.

## 2024-12-27 NOTE — TELEPHONE ENCOUNTER
----- Message from Mauro Sepulveda MD sent at 12/26/2024  6:53 PM CST -----  Regarding: FW:  The thyroid is beautifully controlled. I dont have any recommendations for any changes to thyroid medicine. The numbers dont get better than this. Is she open to trying some zepbound?  ----- Message -----  From: Lab, Background User  Sent: 12/26/2024   5:35 PM CST  To: Mauro Sepulveda MD

## 2024-12-28 LAB
DHEA-S SERPL-MCNC: 130 MCG/DL (ref 16–195)
ZINC SERPL-MCNC: 88 MCG/DL (ref 60–106)

## 2025-01-02 ENCOUNTER — TELEPHONE (OUTPATIENT)
Dept: INTERNAL MEDICINE | Facility: CLINIC | Age: 55
End: 2025-01-02
Payer: COMMERCIAL

## 2025-01-02 DIAGNOSIS — E03.8 OTHER SPECIFIED HYPOTHYROIDISM: Primary | ICD-10-CM

## 2025-01-02 NOTE — TELEPHONE ENCOUNTER
Pt informed of results, voiced understanding   Labs ordered         Luciano Hdz Staff  Caller: Unspecified (Today,  4:14 PM)  .Who Called: Nga Barrientos Pura    Patient is returning phone call    Who Left Message for Patient:  Whitley    Does the patient know what this is regarding?: Results    Preferred Method of Contact: Phone Call    Patient's Preferred Phone Number on File: [phone]    Best Call Back Number, if different:    Additional Information: Pt is returning missed call. Please advise, thank you.   Oxybutynin Counseling:  I discussed with the patient the risks of oxybutynin including but not limited to skin rash, drowsiness, dry mouth, difficulty urinating, and blurred vision.

## 2025-01-02 NOTE — TELEPHONE ENCOUNTER
----- Message from YOSI Grimes sent at 1/2/2025  3:43 PM CST -----  Please inform patient of results.    1. TSH is low and FT4 is elevated - this is not the cause of her weight gain. Let's keep her at current dose of synthroid and recheck TSH + FT4 in 4 weeks. If it remains a little low we may reduce dose a little.    2. Other labwork OK    Thanks for all you do,    Basim

## 2025-02-17 ENCOUNTER — TELEPHONE (OUTPATIENT)
Dept: INTERNAL MEDICINE | Facility: CLINIC | Age: 55
End: 2025-02-17
Payer: COMMERCIAL

## 2025-02-17 DIAGNOSIS — E03.8 OTHER SPECIFIED HYPOTHYROIDISM: Primary | ICD-10-CM

## 2025-02-17 RX ORDER — LEVOTHYROXINE SODIUM 200 UG/1
200 TABLET ORAL
Qty: 90 TABLET | Refills: 3 | Status: SHIPPED | OUTPATIENT
Start: 2025-02-17 | End: 2026-02-17

## 2025-02-17 RX ORDER — LEVOTHYROXINE SODIUM 200 UG/1
200 TABLET ORAL
Qty: 30 TABLET | Refills: 11 | Status: SHIPPED | OUTPATIENT
Start: 2025-02-17 | End: 2025-02-17

## 2025-02-17 NOTE — TELEPHONE ENCOUNTER
----- Message from Erika sent at 2/17/2025  8:14 AM CST -----  [facility]  [place] - [address] [[id number]]levothyroxine (SYNTHROID) 200 MCG tablet QTY: 30

## 2025-03-03 ENCOUNTER — TELEPHONE (OUTPATIENT)
Dept: INTERNAL MEDICINE | Facility: CLINIC | Age: 55
End: 2025-03-03
Payer: COMMERCIAL

## 2025-03-03 DIAGNOSIS — E03.8 OTHER SPECIFIED HYPOTHYROIDISM: ICD-10-CM

## 2025-03-03 DIAGNOSIS — R60.9 EDEMA, UNSPECIFIED TYPE: ICD-10-CM

## 2025-03-03 RX ORDER — LEVOTHYROXINE SODIUM 200 UG/1
200 TABLET ORAL
Qty: 90 TABLET | Refills: 3 | Status: SHIPPED | OUTPATIENT
Start: 2025-03-03 | End: 2026-03-03

## 2025-03-03 RX ORDER — FUROSEMIDE 20 MG/1
20 TABLET ORAL
Qty: 30 TABLET | Refills: 2 | Status: SHIPPED | OUTPATIENT
Start: 2025-03-03

## 2025-03-03 NOTE — TELEPHONE ENCOUNTER
----- Message from Kristine sent at 3/3/2025  1:47 PM CST -----  .Who Called: Nga Barrientos MelanconRefill or New Rx:RefillRX Name and Strength:furosemide (LASIX) 20 MG tabletHow is the patient currently taking it? (ex. 1XDay):as needed Is this a 30 day or 90 day RX: 30Local or Mail Order:local List of preferred pharmacies on file (remove unneeded): [facility] If different Pharmacy is requested, enter Pharmacy information here including location and phone number: Ordering Provider:sheri Joseph Method of Contact: Phone CallPatient's Preferred Phone Number on File: [phone] Best Call Back Number, if different:Additional Information: furosemide (LASIX) 20 MG tablet.Who Called: Nga Barrientos MelanconRefill or New Rx:RefillRX Name and Strength:levothyroxine (SYNTHROID) 200 MCG tabletHow is the patient currently taking it? (ex. 1XDay):1x per dayIs this a 30 day or 90 day RX:90Local or Mail Order:localList of preferred pharmacies on file (remove unneeded): [facility] on [place] different Pharmacy is requested, enter Pharmacy information here including location and phone number: Ordering Provider:Tod Method of Contact: Phone CallPatient's Preferred Phone Number on File: [phone] Best Call Back Number, if different:Additional Information: levothyroxine (SYNTHROID) 200 MCG tablet

## 2025-03-12 ENCOUNTER — TELEPHONE (OUTPATIENT)
Dept: INTERNAL MEDICINE | Facility: CLINIC | Age: 55
End: 2025-03-12
Payer: COMMERCIAL

## 2025-03-12 DIAGNOSIS — E03.8 OTHER SPECIFIED HYPOTHYROIDISM: Primary | ICD-10-CM

## 2025-03-12 NOTE — TELEPHONE ENCOUNTER
----- Message from Med Assistant Andrade sent at 3/12/2025  9:19 AM CDT -----  Regarding: PV 3/26/25 @ :00 Dr. Salomon  1. Are there any outstanding tasks in the patient's chart? Yes, Nonfasting Labs2. Does patient have home blood pressure cuff?  [ ] Yes  /   [ ] No(If yes, please have patient bring to appointment for validation.)3. Remind patient to bring in a list of medications or bottles of all medications including: A. All Prescription MedicationsB. Over-the-Counter Supplements and/or VitaminsC. Drops (ear and/or eye)D. Topical Creams

## 2025-03-12 NOTE — TELEPHONE ENCOUNTER
----- Message from Med Assistant Coleman sent at 3/12/2025  2:39 PM CDT -----  Please return patients call regarding her appointment reminder.  ----- Message -----  From: Hawk Gonzales  Sent: 3/12/2025   1:37 PM CDT  To: Cecilio Wade Staff    .Who Called: Nga Rossdeaux MelanconPatient is returning phone callWho Left Message for Patient:Nuno Pimentel the patient know what this is regarding?:appt reminderPreferred Method of Contact: Phone CallPatient's Preferred Phone Number on File: [phone] Best Call Back Number, if different:Additional Information: pt has additional questions

## 2025-03-14 ENCOUNTER — LAB VISIT (OUTPATIENT)
Dept: LAB | Facility: HOSPITAL | Age: 55
End: 2025-03-14
Attending: INTERNAL MEDICINE
Payer: COMMERCIAL

## 2025-03-14 DIAGNOSIS — E03.8 OTHER SPECIFIED HYPOTHYROIDISM: ICD-10-CM

## 2025-03-14 LAB
T3FREE SERPL-MCNC: 3.05 PG/ML (ref 1.58–3.91)
T4 FREE SERPL-MCNC: 1.6 NG/DL (ref 0.7–1.48)
TSH SERPL-ACNC: 0.02 UIU/ML (ref 0.35–4.94)

## 2025-03-14 PROCEDURE — 36415 COLL VENOUS BLD VENIPUNCTURE: CPT

## 2025-03-14 PROCEDURE — 84439 ASSAY OF FREE THYROXINE: CPT

## 2025-03-14 PROCEDURE — 84481 FREE ASSAY (FT-3): CPT

## 2025-03-14 PROCEDURE — 84443 ASSAY THYROID STIM HORMONE: CPT

## 2025-03-16 ENCOUNTER — RESULTS FOLLOW-UP (OUTPATIENT)
Dept: HEPATOLOGY | Facility: HOSPITAL | Age: 55
End: 2025-03-16
Payer: COMMERCIAL

## 2025-03-26 ENCOUNTER — OFFICE VISIT (OUTPATIENT)
Dept: INTERNAL MEDICINE | Facility: CLINIC | Age: 55
End: 2025-03-26
Payer: COMMERCIAL

## 2025-03-26 VITALS
DIASTOLIC BLOOD PRESSURE: 76 MMHG | WEIGHT: 220 LBS | TEMPERATURE: 97 F | RESPIRATION RATE: 16 BRPM | HEIGHT: 65 IN | OXYGEN SATURATION: 99 % | SYSTOLIC BLOOD PRESSURE: 124 MMHG | BODY MASS INDEX: 36.65 KG/M2 | HEART RATE: 65 BPM

## 2025-03-26 DIAGNOSIS — L65.9 NONSCARRING HAIR LOSS, UNSPECIFIED: ICD-10-CM

## 2025-03-26 DIAGNOSIS — R79.89 HIGH SERUM TESTOSTERONE: ICD-10-CM

## 2025-03-26 DIAGNOSIS — R63.4 WEIGHT LOSS: Primary | ICD-10-CM

## 2025-03-26 DIAGNOSIS — E66.812 CLASS 2 OBESITY WITHOUT SERIOUS COMORBIDITY WITH BODY MASS INDEX (BMI) OF 36.0 TO 36.9 IN ADULT, UNSPECIFIED OBESITY TYPE: ICD-10-CM

## 2025-03-26 DIAGNOSIS — E03.8 OTHER SPECIFIED HYPOTHYROIDISM: Chronic | ICD-10-CM

## 2025-03-26 PROCEDURE — 3074F SYST BP LT 130 MM HG: CPT | Mod: CPTII,,, | Performed by: INTERNAL MEDICINE

## 2025-03-26 PROCEDURE — 3008F BODY MASS INDEX DOCD: CPT | Mod: CPTII,,, | Performed by: INTERNAL MEDICINE

## 2025-03-26 PROCEDURE — 1159F MED LIST DOCD IN RCRD: CPT | Mod: CPTII,,, | Performed by: INTERNAL MEDICINE

## 2025-03-26 PROCEDURE — 3078F DIAST BP <80 MM HG: CPT | Mod: CPTII,,, | Performed by: INTERNAL MEDICINE

## 2025-03-26 PROCEDURE — 1160F RVW MEDS BY RX/DR IN RCRD: CPT | Mod: CPTII,,, | Performed by: INTERNAL MEDICINE

## 2025-03-26 PROCEDURE — 99214 OFFICE O/P EST MOD 30 MIN: CPT | Mod: ,,, | Performed by: INTERNAL MEDICINE

## 2025-03-26 RX ORDER — PHENTERMINE HYDROCHLORIDE 37.5 MG/1
37.5 TABLET ORAL
Qty: 30 TABLET | Refills: 0 | Status: SHIPPED | OUTPATIENT
Start: 2025-03-26 | End: 2025-04-25

## 2025-03-26 RX ORDER — SPIRONOLACTONE 50 MG/1
50 TABLET, FILM COATED ORAL DAILY
Qty: 30 TABLET | Refills: 11 | Status: SHIPPED | OUTPATIENT
Start: 2025-03-26 | End: 2026-03-26

## 2025-03-26 NOTE — PROGRESS NOTES
Internal Medicine    Patient ID: [id number]     Chief Complaint: Thyroid Problem (3 month f/u)      HPI:     Nga Neves is a 54 y.o. female here today for a follow up.     Patient reports ongoing challenges with weight management. Her weight has increased from 208 to 220 lbs over the past year, with her lowest recorded weight being 199 lbs in October 2023.     Patient has recently started a new diet plan, which includes a structured meal plan focusing on whole foods with less than three ingredients. Her current diet consists of eggs, sourdough bread, and fruit for breakfast; chicken, rice, and vegetables for lunch; and fish or shrimp with vegetables and potato for dinner. She also includes fruits as snacks. Patient reports increased hunger every three hours.    She expresses interest in trying Adipex or similar medications for weight management.    Patient discusses high testosterone levels, which her nutritionist noted might be contributing to her increased appetite.    Patient denies palpitations, diarrhea, or heart disease.    TEST RESULTS:  Patient's recent TSH test showed normal results, but within the hyperthyroid range.        Past Medical History:   Diagnosis Date    Anxiety 6/30/2022    Family history of hemochromatosis 6/30/2022    Hot flashes 6/30/2022    Hypothyroidism 6/30/2022    Hypothyroidism, unspecified     Methicillin resistant Staphylococcus aureus infection 6/30/2022 2007    Seasonal allergies 8/30/2022    Vertigo 6/30/2022        Past Surgical History:   Procedure Laterality Date    ABDOMINOPLASTY N/A 07/07/2021    Abdominoplasty and liposuction N/A     APPENDECTOMY      BREAST SURGERY  2022    Breast reduction    BUNIONECTOMY N/A     COSMETIC SURGERY  2022    Skin removal, tummy tuck    Liposuction (Flank, Bilateral) Bilateral 07/07/2021    Mammogram  11/24/2020    MASTOPEXY  07/07/2021    REDUCTION OF BOTH BREASTS Bilateral 07/07/2021    Skin care      surgery on back due  "to mrsa 2007      TONSILLECTOMY      1975    TONSILLECTOMY AND ADENOIDECTOMY          Social History     Tobacco Use    Smoking status: Never    Smokeless tobacco: Never   Substance and Sexual Activity    Alcohol use: Yes     Comment: 1-2 times per month    Drug use: Never    Sexual activity: Not Currently     Partners: Male     Birth control/protection: Abstinence        Current Outpatient Medications   Medication Instructions    furosemide (LASIX) 20 mg, Oral, As needed (PRN)    levothyroxine (SYNTHROID) 200 mcg, Oral, Before breakfast    loratadine (CLARITIN) 10 mg, Daily    phentermine (ADIPEX-P) 37.5 mg, Oral, Before breakfast, 1/2 tab a day for a week then whole tab thereafter    spironolactone (ALDACTONE) 50 mg, Oral, Daily       Review of patient's allergies indicates:  No Known Allergies     Patient Care Team:  Mauro Sepulveda MD as PCP - General (Internal Medicine)  Per Means MD as Consulting Physician (Obstetrics and Gynecology)  [facility] -     Subjective:     Review of Systems    12 point review of systems conducted, negative except as stated in the history of present illness. See HPI for details.    Objective:     Visit Vitals  /76 (BP Location: Left arm, Patient Position: Sitting)   Pulse 65   Temp 97.3 °F (36.3 °C) (Temporal)   Resp 16   Ht 5' 5" (1.651 m)   Wt 99.8 kg (220 lb)   SpO2 99%   BMI 36.61 kg/m²       Physical Exam  Constitutional:       Appearance: Normal appearance.   HENT:      Head: Normocephalic and atraumatic.   Eyes:      Extraocular Movements: Extraocular movements intact.      Pupils: Pupils are equal, round, and reactive to light.   Cardiovascular:      Rate and Rhythm: Normal rate and regular rhythm.   Pulmonary:      Effort: Pulmonary effort is normal.      Breath sounds: Normal breath sounds.   Skin:     General: Skin is warm and dry.   Neurological:      General: No focal deficit present.      Mental Status: She is alert. "   Psychiatric:         Mood and Affect: Mood normal.         Labs Reviewed:     Chemistry:  Lab Results   Component Value Date     12/26/2024    K 3.8 12/26/2024    BUN 12.0 12/26/2024    CREATININE 0.63 12/26/2024    EGFRNORACEVR >60 12/26/2024    GLUCOSE 92 12/26/2024    CALCIUM 9.4 12/26/2024    ALKPHOS 89 12/26/2024    LABPROT 6.8 12/26/2024    ALBUMIN 4.2 12/26/2024    BILIDIR 0.2 12/30/2021    IBILI 0.20 12/30/2021    AST 25 12/26/2024    ALT 40 12/26/2024    RHFGPDXA96CG 22.9 (L) 03/19/2024    TSH 0.019 (L) 03/14/2025    [id number] 1.60 (H) 03/14/2025        Lab Results   Component Value Date    HGBA1C 4.8 11/16/2024        Hematology:  Lab Results   Component Value Date    WBC 3.75 (L) 12/26/2024    HGB 14.1 12/26/2024    HCT 41.0 12/26/2024     12/26/2024       Lipid Panel:  Lab Results   Component Value Date    CHOL 168 11/16/2024    HDL 79 (H) 11/16/2024    LDL 82.00 11/16/2024    TRIG 33 (L) 11/16/2024    TOTALCHOLEST 2 11/16/2024        Urine:  Lab Results   Component Value Date    APPEARANCEUA Clear 11/16/2024    SGUA 1.015 11/16/2024    PROTEINUA Negative 11/16/2024    KETONESUA Negative 11/16/2024    LEUKOCYTESUR Negative 11/16/2024    RBCUA None Seen 11/16/2024    WBCUA 0-2 11/16/2024    BACTERIA None Seen 11/16/2024        Assessment and Plan:     Assessment & Plan    R79.89 High serum testosterone  R63.4 Weight loss  E66.812, Z68.36 Class 2 obesity without serious comorbidity with body mass index (BMI) of 36.0 to 36.9 in adult, unspecified obesity type  E03.8 Other specified hypothyroidism      IMPRESSION:   Addressed hair thinning concerns, considering current thyroid levels and testosterone influence.   Started aldactone 50 mg daily to address elevated testosterone levels, which may be contributing to increased appetite and hair thinning.   Considered potential need for metformin if aldactone alone is ineffective for hypergonadism management.   Assessed current diet plan, noting  its alignment with Mediterranean-style eating.    R79.89 HIGH SERUM TESTOSTERONE:  - Prescribed Aldactone 50 mg daily to address elevated levels.  - Ordered testosterone level recheck.    R63.4 WEIGHT LOSS:  - Prescribed Adipex: 1/2 tablet daily for 1 week, then increase to 1 tablet daily.      E03.8 OTHER SPECIFIED HYPOTHYROIDISM:  - Continue current thyroid medication dosage.  - Ordered TSH recheck.  - May decrease thyroid medication dose if alopecia persists.    L65.9 NONSCARRING HAIR LOSS, UNSPECIFIED:  - Recommend AG Pro as a more cost-effective alternative to Nutrafol for additional hair growth support.  - Ordered BMP to monitor effects of Aldactone.          Follow up in about 4 weeks (around 4/23/2025). In addition to their scheduled follow up, the patient has also been instructed to follow up on as needed basis.     Future Appointments   Date Time Provider Department Center   5/12/2025  3:00 PM Mauro Sepulveda MD [facility] [id number]        Mauro Sepulveda MD

## 2025-04-28 ENCOUNTER — TELEPHONE (OUTPATIENT)
Dept: INTERNAL MEDICINE | Facility: CLINIC | Age: 55
End: 2025-04-28
Payer: COMMERCIAL

## 2025-04-28 DIAGNOSIS — R79.89 HIGH SERUM TESTOSTERONE: Primary | ICD-10-CM

## 2025-04-28 DIAGNOSIS — E03.8 OTHER SPECIFIED HYPOTHYROIDISM: ICD-10-CM

## 2025-04-28 NOTE — TELEPHONE ENCOUNTER
----- Message from Med Assistant Andrade sent at 4/28/2025 10:55 AM CDT -----  Regarding: AMY 5/12/25 @ 3:00 Dr. Salomon  1. Are there any outstanding tasks in the patient's chart? Yes, Nonfasting Labs2. Does patient have home blood pressure cuff?  [ ] Yes  /   [ ] No(If yes, please have patient bring to appointment for validation.)3. Remind patient to bring in a list of medications or bottles of all medications including: A. All Prescription MedicationsB. Over-the-Counter Supplements and/or VitaminsC. Drops (ear and/or eye)D. Topical Creams

## 2025-05-05 ENCOUNTER — PATIENT MESSAGE (OUTPATIENT)
Dept: INTERNAL MEDICINE | Facility: CLINIC | Age: 55
End: 2025-05-05
Payer: COMMERCIAL

## 2025-05-12 ENCOUNTER — PATIENT MESSAGE (OUTPATIENT)
Dept: INTERNAL MEDICINE | Facility: CLINIC | Age: 55
End: 2025-05-12

## 2025-05-12 ENCOUNTER — OFFICE VISIT (OUTPATIENT)
Dept: INTERNAL MEDICINE | Facility: CLINIC | Age: 55
End: 2025-05-12
Payer: COMMERCIAL

## 2025-05-12 ENCOUNTER — LAB VISIT (OUTPATIENT)
Dept: LAB | Facility: HOSPITAL | Age: 55
End: 2025-05-12
Attending: INTERNAL MEDICINE
Payer: COMMERCIAL

## 2025-05-12 VITALS
DIASTOLIC BLOOD PRESSURE: 74 MMHG | OXYGEN SATURATION: 98 % | HEART RATE: 75 BPM | SYSTOLIC BLOOD PRESSURE: 122 MMHG | WEIGHT: 219 LBS | RESPIRATION RATE: 16 BRPM | HEIGHT: 65 IN | TEMPERATURE: 98 F | BODY MASS INDEX: 36.49 KG/M2

## 2025-05-12 DIAGNOSIS — T75.3XXS MOTION SICKNESS, SEQUELA: Primary | ICD-10-CM

## 2025-05-12 DIAGNOSIS — E66.812 CLASS 2 OBESITY WITHOUT SERIOUS COMORBIDITY WITH BODY MASS INDEX (BMI) OF 36.0 TO 36.9 IN ADULT, UNSPECIFIED OBESITY TYPE: ICD-10-CM

## 2025-05-12 DIAGNOSIS — R79.89 HIGH SERUM TESTOSTERONE: ICD-10-CM

## 2025-05-12 DIAGNOSIS — Z83.49 FAMILY HISTORY OF HEMOCHROMATOSIS: ICD-10-CM

## 2025-05-12 DIAGNOSIS — G47.10 HYPERSOMNIA: Primary | ICD-10-CM

## 2025-05-12 DIAGNOSIS — E03.8 OTHER SPECIFIED HYPOTHYROIDISM: Chronic | ICD-10-CM

## 2025-05-12 DIAGNOSIS — E03.8 OTHER SPECIFIED HYPOTHYROIDISM: ICD-10-CM

## 2025-05-12 DIAGNOSIS — L65.9 ALOPECIA: ICD-10-CM

## 2025-05-12 DIAGNOSIS — Z13.6 ENCOUNTER FOR SCREENING FOR CARDIOVASCULAR DISORDERS: ICD-10-CM

## 2025-05-12 DIAGNOSIS — R63.4 WEIGHT LOSS: ICD-10-CM

## 2025-05-12 LAB
ANION GAP SERPL CALC-SCNC: 7 MEQ/L
BUN SERPL-MCNC: 16.5 MG/DL (ref 9.8–20.1)
CALCIUM SERPL-MCNC: 9.4 MG/DL (ref 8.4–10.2)
CHLORIDE SERPL-SCNC: 108 MMOL/L (ref 98–107)
CO2 SERPL-SCNC: 28 MMOL/L (ref 22–29)
CORTIS SERPL-SCNC: 6.5 UG/DL
CREAT SERPL-MCNC: 0.77 MG/DL (ref 0.55–1.02)
CREAT/UREA NIT SERPL: 21
GFR SERPLBLD CREATININE-BSD FMLA CKD-EPI: >60 ML/MIN/1.73/M2
GLUCOSE SERPL-MCNC: 92 MG/DL (ref 74–100)
POTASSIUM SERPL-SCNC: 4.4 MMOL/L (ref 3.5–5.1)
SODIUM SERPL-SCNC: 143 MMOL/L (ref 136–145)
T3FREE SERPL-MCNC: 2.74 PG/ML (ref 1.58–3.91)
T4 FREE SERPL-MCNC: 1.54 NG/DL (ref 0.7–1.48)
TESTOST SERPL-MCNC: 34.29 NG/DL (ref 12.4–35.75)
TSH SERPL-ACNC: 0.01 UIU/ML (ref 0.35–4.94)

## 2025-05-12 PROCEDURE — 36415 COLL VENOUS BLD VENIPUNCTURE: CPT

## 2025-05-12 PROCEDURE — 3008F BODY MASS INDEX DOCD: CPT | Mod: CPTII,,, | Performed by: INTERNAL MEDICINE

## 2025-05-12 PROCEDURE — 84481 FREE ASSAY (FT-3): CPT

## 2025-05-12 PROCEDURE — 1159F MED LIST DOCD IN RCRD: CPT | Mod: CPTII,,, | Performed by: INTERNAL MEDICINE

## 2025-05-12 PROCEDURE — 1160F RVW MEDS BY RX/DR IN RCRD: CPT | Mod: CPTII,,, | Performed by: INTERNAL MEDICINE

## 2025-05-12 PROCEDURE — 3078F DIAST BP <80 MM HG: CPT | Mod: CPTII,,, | Performed by: INTERNAL MEDICINE

## 2025-05-12 PROCEDURE — 3074F SYST BP LT 130 MM HG: CPT | Mod: CPTII,,, | Performed by: INTERNAL MEDICINE

## 2025-05-12 PROCEDURE — 84443 ASSAY THYROID STIM HORMONE: CPT

## 2025-05-12 PROCEDURE — 99214 OFFICE O/P EST MOD 30 MIN: CPT | Mod: ,,, | Performed by: INTERNAL MEDICINE

## 2025-05-12 PROCEDURE — 82533 TOTAL CORTISOL: CPT

## 2025-05-12 PROCEDURE — 84403 ASSAY OF TOTAL TESTOSTERONE: CPT

## 2025-05-12 PROCEDURE — 84439 ASSAY OF FREE THYROXINE: CPT

## 2025-05-12 PROCEDURE — 80048 BASIC METABOLIC PNL TOTAL CA: CPT

## 2025-05-12 PROCEDURE — 84403 ASSAY OF TOTAL TESTOSTERONE: CPT | Mod: 59

## 2025-05-12 RX ORDER — SCOPOLAMINE 1 MG/3D
1 PATCH, EXTENDED RELEASE TRANSDERMAL
Qty: 4 PATCH | Refills: 0 | Status: SHIPPED | OUTPATIENT
Start: 2025-05-12

## 2025-05-12 RX ORDER — PHENTERMINE HYDROCHLORIDE 37.5 MG/1
37.5 TABLET ORAL
Qty: 30 TABLET | Refills: 0 | Status: SHIPPED | OUTPATIENT
Start: 2025-05-12 | End: 2025-06-11

## 2025-05-12 RX ORDER — LEVOTHYROXINE SODIUM 175 UG/1
175 TABLET ORAL
Qty: 30 TABLET | Refills: 11 | Status: SHIPPED | OUTPATIENT
Start: 2025-05-12 | End: 2026-05-12

## 2025-05-12 NOTE — PROGRESS NOTES
Internal Medicine    Patient ID: [id number]     Chief Complaint: Weight Loss (4 week f/u) and Thyroid Problem (4 week f/u)      HPI:     Nga Neves is a 54 y.o. female here today for a follow up.     Patient reports adherence to her diet until recent social events involving meals out on Thursday, Friday, and Saturday, including a family dinner and crawfish with her parents and children. Her morning weight on Wednesday was 214 lbs, an improvement but still not satisfactory given her efforts. She has been taking half the prescribed dose of testosterone medication (25 mg) due to discomfort in the first few days of full dosage. She reports no palpitations or awareness of her heartbeat with the current thyroid medication. She sleeps well, going to bed at 4:30 AM daily to attend the gym, a routine she has maintained for 5 years. She notes that the previously prescribed Adipex helped reduce her constant desire to eat and she noticed a difference in how her clothes fit.  TEST RESULTS:  Patient's recent testosterone level has improved. Her thyroid level was recently found to be tight     SOCIAL HISTORY:  Occupation: Employed        Past Medical History:   Diagnosis Date    Anxiety 6/30/2022    Family history of hemochromatosis 6/30/2022    Hot flashes 6/30/2022    Hypothyroidism 6/30/2022    Hypothyroidism, unspecified     Methicillin resistant Staphylococcus aureus infection 6/30/2022 2007    Seasonal allergies 8/30/2022    Vertigo 6/30/2022        Past Surgical History:   Procedure Laterality Date    ABDOMINOPLASTY N/A 07/07/2021    Abdominoplasty and liposuction N/A     APPENDECTOMY      BREAST SURGERY  2022    Breast reduction    BUNIONECTOMY N/A     COSMETIC SURGERY  2022    Skin removal, tummy tuck    Liposuction (Flank, Bilateral) Bilateral 07/07/2021    Mammogram  11/24/2020    MASTOPEXY  07/07/2021    REDUCTION OF BOTH BREASTS Bilateral 07/07/2021    Skin care      surgery on back due to mrsa 2007  "     TONSILLECTOMY      1975    TONSILLECTOMY AND ADENOIDECTOMY          Social History     Tobacco Use    Smoking status: Never    Smokeless tobacco: Never   Substance and Sexual Activity    Alcohol use: Yes     Comment: 1-2 times per month    Drug use: Never    Sexual activity: Not Currently     Partners: Male     Birth control/protection: Abstinence        Current Outpatient Medications   Medication Instructions    furosemide (LASIX) 20 mg, Oral, As needed (PRN)    levothyroxine (SYNTHROID) 175 mcg, Oral, Before breakfast    loratadine (CLARITIN) 10 mg, Daily    phentermine (ADIPEX-P) 37.5 mg, Oral, Before breakfast, 1/2 tab a day for a week then whole tab thereafter    scopolamine (TRANSDERM-SCOP) 1.3-1.5 mg (1 mg over 3 days) 1 patch, Transdermal, Every 72 hours    spironolactone (ALDACTONE) 50 mg, Oral, Daily       Review of patient's allergies indicates:  No Known Allergies     Patient Care Team:  Mauro Sepulveda MD as PCP - General (Internal Medicine)  Per Means MD as Consulting Physician (Obstetrics and Gynecology)  [facility] -     Subjective:     Review of Systems    12 point review of systems conducted, negative except as stated in the history of present illness. See HPI for details.    Objective:     Visit Vitals  /74 (BP Location: Left arm, Patient Position: Sitting)   Pulse 75   Temp 97.6 °F (36.4 °C) (Temporal)   Resp 16   Ht 5' 5" (1.651 m)   Wt 99.3 kg (219 lb)   SpO2 98%   BMI 36.44 kg/m²       Physical Exam  Constitutional:       Appearance: Normal appearance. She is obese.   Pulmonary:      Effort: Pulmonary effort is normal.   Neurological:      Mental Status: She is alert.   Psychiatric:         Mood and Affect: Mood normal.         Behavior: Behavior normal.         Labs Reviewed:     Chemistry:  Lab Results   Component Value Date     05/12/2025    K 4.4 05/12/2025    BUN 16.5 05/12/2025    CREATININE 0.77 05/12/2025    EGFRNORACEVR " >60 05/12/2025    CALCIUM 9.4 05/12/2025    ALKPHOS 89 12/26/2024    ALBUMIN 4.2 12/26/2024    BILIDIR 0.2 12/30/2021    IBILI 0.20 12/30/2021    AST 25 12/26/2024    ALT 40 12/26/2024    UFBIPHJD83TA 22.9 (L) 03/19/2024    TSH 0.013 (L) 05/12/2025    XARIFX3MPEZ 1.54 (H) 05/12/2025        Lab Results   Component Value Date    HGBA1C 4.8 11/16/2024        Hematology:  Lab Results   Component Value Date    WBC 3.75 (L) 12/26/2024    HGB 14.1 12/26/2024    HCT 41.0 12/26/2024     12/26/2024       Lipid Panel:  Lab Results   Component Value Date    CHOL 168 11/16/2024    HDL 79 (H) 11/16/2024    LDL 82.00 11/16/2024    TRIG 33 (L) 11/16/2024    TOTALCHOLEST 2 11/16/2024        Urine:  Lab Results   Component Value Date    APPEARANCEUA Clear 11/16/2024    SGUA 1.015 11/16/2024    PROTEINUA Negative 11/16/2024    KETONESUA Negative 11/16/2024    LEUKOCYTESUR Negative 11/16/2024    RBCUA None Seen 11/16/2024    WBCUA 0-2 11/16/2024    BACTERIA None Seen 11/16/2024        Assessment and Plan:     Assessment & Plan    E03.8 Other specified hypothyroidism  R63.4 Weight loss  G47.10 Hypersomnia  Z13.6 Encounter for screening for cardiovascular disorders  E66.812, Z68.36 Class 2 obesity without serious comorbidity with body mass index (BMI) of 36.0 to 36.9 in adult, unspecified obesity type  Z83.49 Family history of other endocrine, nutritional and metabolic diseases    IMPRESSION:   Considered adjusting thyroid medication dose due to lab results, but decided against it due to lack of symptoms and ongoing weight management efforts.   Evaluated current weight loss strategies and determined need for more effective medication, reviewing response to previous medications    Assessed sleep quality and considered potential for sleep apnea    R63.4 WEIGHT LOSS:  - Continue current management  - Refill Adipex     G47.10 HYPERSOMNIA:  - Ordered home sleep study to evaluate for potential sleep apnea.  - Discussed the relationship  between weight loss and improvement in sleep apnea symptoms.    E66.812, Z68.36 CLASS 2 OBESITY WITHOUT SERIOUS COMORBIDITY WITH BODY MASS INDEX (BMI) OF 36.0 TO 36.9 IN ADULT, UNSPECIFIED OBESITY TYPE:  - Consider GLP1 for weight management.  - Explained the mechanism of action for Zepbound, including its dual FDA approval for obesity.  - Discussed the relationship between weight loss and improvement in sleep apnea symptoms.  - Cont adipex for now  - Need to back off on synthroid to 175mcg due to low TSH           No follow-ups on file. In addition to their scheduled follow up, the patient has also been instructed to follow up on as needed basis.     Future Appointments   Date Time Provider Department Center   6/25/2025  3:20 PM Mauro Sepulveda MD [place] [id number]        Mauro Sepulveda MD

## 2025-05-16 LAB — TESTOST SERPL-MCNC: 37 NG/DL (ref 8–60)

## 2025-05-19 ENCOUNTER — RESULTS FOLLOW-UP (OUTPATIENT)
Dept: HEPATOLOGY | Facility: HOSPITAL | Age: 55
End: 2025-05-19

## 2025-05-21 ENCOUNTER — HOSPITAL ENCOUNTER (OUTPATIENT)
Dept: RADIOLOGY | Facility: HOSPITAL | Age: 55
Discharge: HOME | End: 2025-05-21
Attending: INTERNAL MEDICINE
Payer: COMMERCIAL

## 2025-05-21 DIAGNOSIS — Z13.6 ENCOUNTER FOR SCREENING FOR CARDIOVASCULAR DISORDERS: ICD-10-CM

## 2025-05-21 PROCEDURE — 75571 CT HRT W/O DYE W/CA TEST: CPT | Mod: TC

## 2025-05-26 ENCOUNTER — RESULTS FOLLOW-UP (OUTPATIENT)
Dept: HEPATOLOGY | Facility: HOSPITAL | Age: 55
End: 2025-05-26
Payer: COMMERCIAL

## 2025-06-11 ENCOUNTER — TELEPHONE (OUTPATIENT)
Dept: INTERNAL MEDICINE | Facility: CLINIC | Age: 55
End: 2025-06-11
Payer: COMMERCIAL

## 2025-06-11 DIAGNOSIS — E03.8 OTHER SPECIFIED HYPOTHYROIDISM: Primary | ICD-10-CM

## 2025-06-26 ENCOUNTER — TELEPHONE (OUTPATIENT)
Dept: INTERNAL MEDICINE | Facility: CLINIC | Age: 55
End: 2025-06-26
Payer: COMMERCIAL

## 2025-06-26 NOTE — TELEPHONE ENCOUNTER
----- Message from Domingo Andrade sent at 6/26/2025 12:02 PM CDT -----  Regarding: AMY 7/10/25 @ 3:00 YOSI Gupta  1. Are there any outstanding tasks in the patient's chart? Yes, Nonfasting Labs    2. Does patient have home blood pressure cuff?  [ ] Yes  /   [ ] No  (If yes, please have patient bring to appointment for validation.)    3. Remind patient to bring in a list of medications or bottles of all medications including:   A. All Prescription Medications  B. Over-the-Counter Supplements and/or Vitamins  C. Drops (ear and/or eye)  D. Topical Creams

## 2025-07-09 ENCOUNTER — LAB VISIT (OUTPATIENT)
Dept: LAB | Facility: HOSPITAL | Age: 55
End: 2025-07-09
Attending: INTERNAL MEDICINE
Payer: COMMERCIAL

## 2025-07-09 ENCOUNTER — RESULTS FOLLOW-UP (OUTPATIENT)
Dept: HEPATOLOGY | Facility: HOSPITAL | Age: 55
End: 2025-07-09
Payer: COMMERCIAL

## 2025-07-09 DIAGNOSIS — E03.8 OTHER SPECIFIED HYPOTHYROIDISM: ICD-10-CM

## 2025-07-09 LAB
T4 FREE SERPL-MCNC: 1.14 NG/DL (ref 0.7–1.48)
TSH SERPL-ACNC: 0.11 UIU/ML (ref 0.35–4.94)

## 2025-07-09 PROCEDURE — 84443 ASSAY THYROID STIM HORMONE: CPT

## 2025-07-09 PROCEDURE — 36415 COLL VENOUS BLD VENIPUNCTURE: CPT

## 2025-07-09 PROCEDURE — 84439 ASSAY OF FREE THYROXINE: CPT

## 2025-07-10 ENCOUNTER — OFFICE VISIT (OUTPATIENT)
Dept: INTERNAL MEDICINE | Facility: CLINIC | Age: 55
End: 2025-07-10
Payer: COMMERCIAL

## 2025-07-10 VITALS
HEART RATE: 76 BPM | SYSTOLIC BLOOD PRESSURE: 132 MMHG | BODY MASS INDEX: 36.99 KG/M2 | OXYGEN SATURATION: 99 % | DIASTOLIC BLOOD PRESSURE: 82 MMHG | HEIGHT: 65 IN | WEIGHT: 222 LBS

## 2025-07-10 DIAGNOSIS — E66.812 CLASS 2 OBESITY WITH BODY MASS INDEX (BMI) OF 35.0 TO 35.9 IN ADULT, UNSPECIFIED OBESITY TYPE, UNSPECIFIED WHETHER SERIOUS COMORBIDITY PRESENT: Chronic | ICD-10-CM

## 2025-07-10 DIAGNOSIS — E03.8 OTHER SPECIFIED HYPOTHYROIDISM: Primary | Chronic | ICD-10-CM

## 2025-07-24 ENCOUNTER — TELEPHONE (OUTPATIENT)
Dept: INTERNAL MEDICINE | Facility: CLINIC | Age: 55
End: 2025-07-24
Payer: COMMERCIAL

## 2025-07-24 DIAGNOSIS — E03.8 OTHER SPECIFIED HYPOTHYROIDISM: Primary | ICD-10-CM

## 2025-07-24 NOTE — TELEPHONE ENCOUNTER
----- Message from Med Assistant Andrade sent at 7/24/2025 10:25 AM CDT -----  Regarding: AMY 8/7/25 @ 1:40 Dr. Slaomon  1. Are there any outstanding tasks in the patient's chart? Yes, Nonfasting Labs    2. Does patient have home blood pressure cuff?  [ ] Yes  /   [ ] No  (If yes, please have patient bring to appointment for validation.)    3. Remind patient to bring in a list of medications or bottles of all medications including:   A. All Prescription Medications  B. Over-the-Counter Supplements and/or Vitamins  C. Drops (ear and/or eye)  D. Topical Creams

## 2025-09-02 ENCOUNTER — PATIENT MESSAGE (OUTPATIENT)
Dept: INTERNAL MEDICINE | Facility: CLINIC | Age: 55
End: 2025-09-02
Payer: COMMERCIAL

## 2025-09-02 ENCOUNTER — TELEPHONE (OUTPATIENT)
Dept: INTERNAL MEDICINE | Facility: CLINIC | Age: 55
End: 2025-09-02
Payer: COMMERCIAL